# Patient Record
Sex: FEMALE | Race: OTHER | NOT HISPANIC OR LATINO
[De-identification: names, ages, dates, MRNs, and addresses within clinical notes are randomized per-mention and may not be internally consistent; named-entity substitution may affect disease eponyms.]

---

## 2023-08-28 ENCOUNTER — TRANSCRIPTION ENCOUNTER (OUTPATIENT)
Age: 73
End: 2023-08-28

## 2023-08-28 ENCOUNTER — INPATIENT (INPATIENT)
Facility: HOSPITAL | Age: 73
LOS: 3 days | Discharge: HOME | DRG: 828 | End: 2023-09-01
Attending: OBSTETRICS & GYNECOLOGY | Admitting: OBSTETRICS & GYNECOLOGY
Payer: MEDICAID

## 2023-08-28 VITALS
RESPIRATION RATE: 18 BRPM | HEART RATE: 58 BPM | HEIGHT: 64 IN | OXYGEN SATURATION: 98 % | SYSTOLIC BLOOD PRESSURE: 133 MMHG | WEIGHT: 188.05 LBS | TEMPERATURE: 98 F | DIASTOLIC BLOOD PRESSURE: 70 MMHG

## 2023-08-28 LAB
ANION GAP SERPL CALC-SCNC: 9 MMOL/L — SIGNIFICANT CHANGE UP (ref 5–17)
BASOPHILS # BLD AUTO: 0.03 K/UL — SIGNIFICANT CHANGE UP (ref 0–0.2)
BASOPHILS NFR BLD AUTO: 0.5 % — SIGNIFICANT CHANGE UP (ref 0–2)
BUN SERPL-MCNC: 43 MG/DL — HIGH (ref 7–23)
CALCIUM SERPL-MCNC: 9.3 MG/DL — SIGNIFICANT CHANGE UP (ref 8.4–10.5)
CHLORIDE SERPL-SCNC: 104 MMOL/L — SIGNIFICANT CHANGE UP (ref 96–108)
CO2 SERPL-SCNC: 28 MMOL/L — SIGNIFICANT CHANGE UP (ref 22–31)
CREAT SERPL-MCNC: 1.65 MG/DL — HIGH (ref 0.5–1.3)
EGFR: 33 ML/MIN/1.73M2 — LOW
EOSINOPHIL # BLD AUTO: 0.13 K/UL — SIGNIFICANT CHANGE UP (ref 0–0.5)
EOSINOPHIL NFR BLD AUTO: 2 % — SIGNIFICANT CHANGE UP (ref 0–6)
GLUCOSE SERPL-MCNC: 119 MG/DL — HIGH (ref 70–99)
HCT VFR BLD CALC: 38.4 % — SIGNIFICANT CHANGE UP (ref 34.5–45)
HGB BLD-MCNC: 12.3 G/DL — SIGNIFICANT CHANGE UP (ref 11.5–15.5)
IMM GRANULOCYTES NFR BLD AUTO: 0.6 % — SIGNIFICANT CHANGE UP (ref 0–0.9)
LYMPHOCYTES # BLD AUTO: 2.82 K/UL — SIGNIFICANT CHANGE UP (ref 1–3.3)
LYMPHOCYTES # BLD AUTO: 42.5 % — SIGNIFICANT CHANGE UP (ref 13–44)
MCHC RBC-ENTMCNC: 29.3 PG — SIGNIFICANT CHANGE UP (ref 27–34)
MCHC RBC-ENTMCNC: 32 GM/DL — SIGNIFICANT CHANGE UP (ref 32–36)
MCV RBC AUTO: 91.4 FL — SIGNIFICANT CHANGE UP (ref 80–100)
MONOCYTES # BLD AUTO: 0.46 K/UL — SIGNIFICANT CHANGE UP (ref 0–0.9)
MONOCYTES NFR BLD AUTO: 6.9 % — SIGNIFICANT CHANGE UP (ref 2–14)
NEUTROPHILS # BLD AUTO: 3.16 K/UL — SIGNIFICANT CHANGE UP (ref 1.8–7.4)
NEUTROPHILS NFR BLD AUTO: 47.5 % — SIGNIFICANT CHANGE UP (ref 43–77)
NRBC # BLD: 0 /100 WBCS — SIGNIFICANT CHANGE UP (ref 0–0)
PLATELET # BLD AUTO: 181 K/UL — SIGNIFICANT CHANGE UP (ref 150–400)
POTASSIUM SERPL-MCNC: 3.7 MMOL/L — SIGNIFICANT CHANGE UP (ref 3.5–5.3)
POTASSIUM SERPL-SCNC: 3.7 MMOL/L — SIGNIFICANT CHANGE UP (ref 3.5–5.3)
RBC # BLD: 4.2 M/UL — SIGNIFICANT CHANGE UP (ref 3.8–5.2)
RBC # FLD: 13.4 % — SIGNIFICANT CHANGE UP (ref 10.3–14.5)
SODIUM SERPL-SCNC: 141 MMOL/L — SIGNIFICANT CHANGE UP (ref 135–145)
WBC # BLD: 6.64 K/UL — SIGNIFICANT CHANGE UP (ref 3.8–10.5)
WBC # FLD AUTO: 6.64 K/UL — SIGNIFICANT CHANGE UP (ref 3.8–10.5)

## 2023-08-28 PROCEDURE — 99285 EMERGENCY DEPT VISIT HI MDM: CPT

## 2023-08-28 PROCEDURE — 99254 IP/OBS CNSLTJ NEW/EST MOD 60: CPT | Mod: 57

## 2023-08-28 RX ORDER — ATORVASTATIN CALCIUM 80 MG/1
80 TABLET, FILM COATED ORAL DAILY
Refills: 0 | Status: DISCONTINUED | OUTPATIENT
Start: 2023-08-28 | End: 2023-08-29

## 2023-08-28 RX ORDER — PANTOPRAZOLE SODIUM 20 MG/1
40 TABLET, DELAYED RELEASE ORAL
Refills: 0 | Status: DISCONTINUED | OUTPATIENT
Start: 2023-08-29 | End: 2023-08-29

## 2023-08-28 RX ORDER — SODIUM CHLORIDE 9 MG/ML
1000 INJECTION, SOLUTION INTRAVENOUS ONCE
Refills: 0 | Status: COMPLETED | OUTPATIENT
Start: 2023-08-28 | End: 2023-08-28

## 2023-08-28 RX ORDER — AMLODIPINE BESYLATE 2.5 MG/1
5 TABLET ORAL DAILY
Refills: 0 | Status: DISCONTINUED | OUTPATIENT
Start: 2023-08-28 | End: 2023-08-29

## 2023-08-28 RX ORDER — ASPIRIN/CALCIUM CARB/MAGNESIUM 324 MG
81 TABLET ORAL DAILY
Refills: 0 | Status: DISCONTINUED | OUTPATIENT
Start: 2023-08-28 | End: 2023-08-29

## 2023-08-28 RX ORDER — IOHEXOL 300 MG/ML
30 INJECTION, SOLUTION INTRAVENOUS ONCE
Refills: 0 | Status: COMPLETED | OUTPATIENT
Start: 2023-08-28 | End: 2023-08-29

## 2023-08-28 RX ORDER — LOSARTAN POTASSIUM 100 MG/1
100 TABLET, FILM COATED ORAL DAILY
Refills: 0 | Status: DISCONTINUED | OUTPATIENT
Start: 2023-08-28 | End: 2023-08-29

## 2023-08-28 RX ADMIN — SODIUM CHLORIDE 1000 MILLILITER(S): 9 INJECTION, SOLUTION INTRAVENOUS at 23:44

## 2023-08-28 NOTE — H&P ADULT - HISTORY OF PRESENT ILLNESS
Son at bedside assisted with translation    73 yo  PMH T2DM, HTN, HLD, Osteoarthritis presenting to [facility] after 1 night admission at [facility] for LLQ pain for which she was found to have complex L adnexal mass. Patient reports that she has been feeling L sided abdominal pressure x3 months, and for the last 2 days had new onset LLQ abdominal pain and constipation. The abdominal pain came on gradually and at its peak was 6/10, described as "pressure". Patient denies dysuria (at baseline has mild urinary incontinence), diarrhea, chest pain, shortness of breath. Her last bowel movement was yesterday, prior to that 2 days before. Adnexal mass diagnosed on CT at [facility]; this is the first time the patient has been made aware of this mass. Of note, patient lives in [place], is visiting her son in the [place] for 2 months.     OBHx   6x  between [id number], 5 living children    GYNHx  S/p open hysterectomy in  for AUB secondary to fibroids, patient knows that her ovaries were left but is not sure if her fallopian tubes/cervix were removed. LMP at this time.    PMH   T2DM  Gout  HTN  HLD  Osteoarthritis (affecting b/l knees)    PSH: As in GYN history    Meds:  Metformin 500 BID; glimepiride 1mg QD  Esomeprazole 40 QD PRN  Olmesartan 40mg QD  Amlodipine 5mg QD  HCTZ 12.5 QD  Rosuvastatin 20mg QD  ASA 81 QD    NKDA    Family history  Denies personal or family history of GYN or other cancers   Son at bedside assisted with translation    71 yo  PMH T2DM, HTN, HLD, Osteoarthritis presenting to [facility] after 1 night admission at [facility] for LLQ pain for which she was found to have complex L adnexal mass. Patient reports that she has been feeling L sided abdominal pressure/fullness x3 months, and for the last 2 days had new onset LLQ abdominal pain and constipation. The abdominal pain came on gradually and at its peak was 6/10, described as "pressure". Patient denies dysuria (at baseline has mild urinary incontinence), diarrhea, chest pain, shortness of breath. Denies unintentional weight loss, vaginal bleeding. Her last bowel movement was yesterday, prior to that 2 days before. Adnexal mass diagnosed on CT at [facility]; this is the first time the patient has been made aware of this mass. Of note, patient lives in [place], is visiting her son in the [place] for 2 months.     Baseline Cr at [facility] today 1.6    OBHx   6x  between [id number], 5 living children    GYNHx  S/p open hysterectomy in  for AUB secondary to fibroids, patient knows that her ovaries were left but is not sure if her fallopian tubes/cervix were removed. LMP at this time.    PMH   T2DM  Gout  HTN  HLD  Osteoarthritis (affecting b/l knees)    PSH: As in GYN history    Meds:  Metformin 500 BID; glimepiride 1mg QD  Esomeprazole 40 QD PRN  Olmesartan 40mg QD  Amlodipine 5mg QD  HCTZ 12.5 QD  Rosuvastatin 20mg QD  ASA 81 QD    NKDA    Family history  Denies personal or family history of GYN or other cancers   Son at bedside assisted with translation (Juanito Noguera)    71 yo  PMH T2DM, HTN, HLD, Osteoarthritis presenting to [facility] after 1 night admission at [facility] for LLQ pain for which she was found to have complex L adnexal mass. Patient reports that she has been feeling L sided abdominal pressure/fullness x3 months, and for the last 2 days had new onset LLQ abdominal pain and constipation. The abdominal pain came on gradually and at its peak was 6/10, described as "pressure". Patient denies dysuria (at baseline has mild urinary incontinence), diarrhea, chest pain, shortness of breath. Denies unintentional weight loss, vaginal bleeding. Her last bowel movement was yesterday, prior to that 2 days before. Adnexal mass diagnosed on CT at [facility]; this is the first time the patient has been made aware of this mass. Of note, patient lives in [place], is visiting her son in the [place] for 2 months.     Baseline Cr at [facility] today 1.6    OBHx   6x  between 2607-7460, 5 living children    GYNHx  S/p open hysterectomy, low transverse entry, in  for AUB secondary to fibroids, patient knows that her ovaries were left but is not sure if her fallopian tubes/cervix were removed. LMP at this time.  Patient has not seen a GYN in many years    PMH   T2DM  Gout  HTN  HLD  Osteoarthritis (affecting b/l knees)    PSH: As in GYN history    Meds:  Metformin 500 BID; glimepiride 1mg QD  Esomeprazole 40 QD PRN  Olmesartan 40mg QD  Amlodipine 5mg QD  HCTZ 12.5 QD  Rosuvastatin 20mg QD  ASA 81 QD    NKDA    Family history  Denies personal or family history of GYN or other cancers   Son at bedside assisted with translation (Juanito Noguera)    71 yo  PMH T2DM, HTN, HLD, Osteoarthritis presenting to [facility] after 1 night admission at [facility] for LLQ pain for which she was found to have complex L adnexal mass. Patient reports that she has been feeling L sided abdominal pressure/fullness x3 months, and for the last 2 days had new onset LLQ abdominal pain and constipation. The abdominal pain came on gradually and at its peak was 6/10, described as "pressure". Patient denies dysuria (at baseline has mild urinary incontinence), diarrhea, chest pain, shortness of breath. Denies unintentional weight loss, vaginal bleeding. Her last bowel movement was yesterday, prior to that 2 days before. 19cm complex adnexal mass diagnosed on CT at [facility]; this is the first time the patient has been made aware of this mass. Of note, patient lives in [place], is visiting her son in the [place] for 2 months.     Baseline Cr at [facility] today 1.6    OBHx   6x  between 1056-2102, 5 living children    GYNHx  S/p open hysterectomy, low transverse entry, in  for AUB secondary to fibroids, patient knows that her ovaries were left but is not sure if her fallopian tubes/cervix were removed. LMP at this time.  Patient has not seen a GYN in many years    PMH   T2DM  Gout  HTN  HLD  Osteoarthritis (affecting b/l knees)    PSH: As in GYN history    Meds:  Metformin 500 BID; glimepiride 1mg QD  Esomeprazole 40 QD PRN  Olmesartan 40mg QD  Amlodipine 5mg QD  HCTZ 12.5 QD  Rosuvastatin 20mg QD  ASA 81 QD    NKDA    Family history  Denies personal or family history of GYN or other cancers

## 2023-08-28 NOTE — ED PROVIDER NOTE - CLINICAL SUMMARY MEDICAL DECISION MAKING FREE TEXT BOX
71 yo female with a hx of HTN, HLD, DM presenting to the ED for a newly diagnosed ovarian mass. +Mass palpable on exam with tenderness. Will repeat imaging. Admit to GYN-ONC for further management- discussed with Dr. Bucio

## 2023-08-28 NOTE — ED ADULT NURSE NOTE - NSFALLUNIVINTERV_ED_ALL_ED
Bed/Stretcher in lowest position, wheels locked, appropriate side rails in place/Call bell, personal items and telephone in reach/Instruct patient to call for assistance before getting out of bed/chair/stretcher/Non-slip footwear applied when patient is off stretcher/[place] to call system/Physically safe environment - no spills, clutter or unnecessary equipment/Purposeful proactive rounding/Room/bathroom lighting operational, light cord in reach

## 2023-08-28 NOTE — ED PROVIDER NOTE - PHYSICAL EXAMINATION
VITAL SIGNS: I have reviewed nursing notes and confirm.  CONSTITUTIONAL: Well appearing, in no acute distress.   SKIN:  warm and dry, no acute rash.   HEAD:  normocephalic, atraumatic.  EYES: EOM intact; conjunctiva and sclera clear.  ENT: No nasal discharge; airway clear.   NECK: Supple; non tender.  CARD: S1, S2 normal; no murmurs, gallops, or rubs. Regular rate and rhythm.   RESP:  Clear to auscultation b/l, no wheezes, rales or rhonchi.  ABD: Normal bowel sounds; +abdominal distention with palpable hard mass with ttp  EXT: Normal ROM. No clubbing, cyanosis or edema. 2+ pulses to b/l ue/le.  NEURO: Alert, oriented, grossly unremarkable  PSYCH: Cooperative, mood and affect appropriate.

## 2023-08-28 NOTE — H&P ADULT - NSHPPHYSICALEXAM_GEN_ALL_CORE
Gen; Alert, NAD  Back: No CVAT  Ext: Tenderness to palpation of b/l knees, no calf tenderness or edema  Abd: Soft, mild discomfort to palpation of LLQ Gen; Alert, NAD  Back: No CVAT  Ext: Tenderness to palpation of b/l knees, no calf tenderness or edema  Abd: Soft, distended, abdominal fullness, +BS, nontympanic, no peritoneal signs; mild discomfort to palpation of LLQ  GYN: physiologic discharge in vaginal vault, no vaginal bleeding - pt unable to tolerate complete speculum exam; cervical os palpated. Significant bilateral adnexal and posterior fullness

## 2023-08-28 NOTE — ED PROVIDER NOTE - OBJECTIVE STATEMENT
73 yo female with a hx of HTN, HLD, DM presenting to the ED for a newly diagnosed ovarian mass. The patient was seen at [facility] for lower abdominal pain x months that got much worse over the past 2 days with nausea. She had a work up that showed a large cystic ovarian mass suspicious for malignancy. Her son spoke to Dr. Bucio (gyn-onc at [facility]) who advised her to come in for a surgical evaluation. Reports the pain now is 4/10 compared to 10/10 when she first arrived to [facility]. Nausea resolved now. Denies dizziness or syncope. No known hx of malignancy. Had a hysterectomy years ago for uterine fibroids. Denies urinary symptoms.

## 2023-08-28 NOTE — H&P ADULT - ASSESSMENT
73 yo  multiple medical comorbidities presenting to [facility] with new diagnosis of complex L adnexal mass diagnosed on CT this morning at [facility].   - Admit to GYN  - F/u repeat CT C/A/P with PO and IV contrast given patient unable to obtain disk  - F/u CA-125, CEA, Inhibin    Carmen PGY2 discussed with Baldev PGY4 and Dr. Perez 71 yo  multiple medical comorbidities presenting to [facility] with new diagnosis of complex L adnexal mass diagnosed on CT this morning at [facility].   - Admit to GYN  - F/u repeat CT C/A/P with PO and IV contrast given patient unable to obtain disk  - F/u CA-125, CEA, Inhibin  - NPO with IV fluids    Carmen PGY2 discussed with Baldev PGY4 and Dr. Perez 71 yo  multiple medical comorbidities presenting to [facility] with new diagnosis of complex L adnexal mass diagnosed on CT this morning at [facility].   - Admit to GYN  - F/u repeat CT C/A/P with PO and IV contrast given patient unable to obtain disk  - NPO with IV fluids  - AM T&S, CA-125, CEA Hb A1c     Carmen PGY2 discussed with Baldev PGY4 and Dr. Perez

## 2023-08-28 NOTE — H&P ADULT - NSHPREVIEWOFSYSTEMS_GEN_ALL_CORE
Denies chest pain, shortness of breath, diarrhea, dysuria  Endorses constipation, urinary incontinence at baseline

## 2023-08-28 NOTE — ED ADULT NURSE NOTE - OBJECTIVE STATEMENT
Pt. a&ox4 ambulatory with steady gait, Ukrainian speaking, hx of HTN, DM2, hysterectomy, comes to ED sent in by her GYN after an ED visit this afternoon @ [facility], for admission after CT imaging results showing an abdominal mass ; pt's coinciding s/s were abdominal pain in the LLQ for the last 3 days. Denies n/v/d weight loss, f/c, HA, fatigue, SOB, cp, dizziness, weakness.

## 2023-08-29 ENCOUNTER — RESULT REVIEW (OUTPATIENT)
Age: 73
End: 2023-08-29

## 2023-08-29 ENCOUNTER — TRANSCRIPTION ENCOUNTER (OUTPATIENT)
Age: 73
End: 2023-08-29

## 2023-08-29 LAB
A1C WITH ESTIMATED AVERAGE GLUCOSE RESULT: 6.2 % — HIGH (ref 4–5.6)
ANION GAP SERPL CALC-SCNC: 10 MMOL/L — SIGNIFICANT CHANGE UP (ref 5–17)
APPEARANCE UR: CLEAR — SIGNIFICANT CHANGE UP
APTT BLD: 27.9 SEC — SIGNIFICANT CHANGE UP (ref 24.5–35.6)
APTT BLD: 30 SEC — SIGNIFICANT CHANGE UP (ref 24.5–35.6)
BACTERIA # UR AUTO: SIGNIFICANT CHANGE UP /HPF
BASOPHILS # BLD AUTO: 0.01 K/UL — SIGNIFICANT CHANGE UP (ref 0–0.2)
BASOPHILS NFR BLD AUTO: 0.2 % — SIGNIFICANT CHANGE UP (ref 0–2)
BILIRUB UR-MCNC: NEGATIVE — SIGNIFICANT CHANGE UP
BLD GP AB SCN SERPL QL: NEGATIVE — SIGNIFICANT CHANGE UP
BUN SERPL-MCNC: 39 MG/DL — HIGH (ref 7–23)
CALCIUM SERPL-MCNC: 8.9 MG/DL — SIGNIFICANT CHANGE UP (ref 8.4–10.5)
CANCER AG125 SERPL-ACNC: 65 U/ML — HIGH
CEA SERPL-MCNC: 2 NG/ML — SIGNIFICANT CHANGE UP (ref 0–3.8)
CHLORIDE SERPL-SCNC: 104 MMOL/L — SIGNIFICANT CHANGE UP (ref 96–108)
CO2 SERPL-SCNC: 26 MMOL/L — SIGNIFICANT CHANGE UP (ref 22–31)
COLOR SPEC: YELLOW — SIGNIFICANT CHANGE UP
CREAT SERPL-MCNC: 1.47 MG/DL — HIGH (ref 0.5–1.3)
DIFF PNL FLD: ABNORMAL
EGFR: 38 ML/MIN/1.73M2 — LOW
EOSINOPHIL # BLD AUTO: 0.13 K/UL — SIGNIFICANT CHANGE UP (ref 0–0.5)
EOSINOPHIL NFR BLD AUTO: 2.3 % — SIGNIFICANT CHANGE UP (ref 0–6)
EPI CELLS # UR: ABNORMAL /HPF (ref 0–5)
ESTIMATED AVERAGE GLUCOSE: 131 MG/DL — HIGH (ref 68–114)
GLUCOSE BLDC GLUCOMTR-MCNC: 228 MG/DL — HIGH (ref 70–99)
GLUCOSE BLDC GLUCOMTR-MCNC: 252 MG/DL — HIGH (ref 70–99)
GLUCOSE BLDC GLUCOMTR-MCNC: 260 MG/DL — HIGH (ref 70–99)
GLUCOSE BLDC GLUCOMTR-MCNC: 86 MG/DL — SIGNIFICANT CHANGE UP (ref 70–99)
GLUCOSE BLDC GLUCOMTR-MCNC: 92 MG/DL — SIGNIFICANT CHANGE UP (ref 70–99)
GLUCOSE BLDC GLUCOMTR-MCNC: 97 MG/DL — SIGNIFICANT CHANGE UP (ref 70–99)
GLUCOSE SERPL-MCNC: 81 MG/DL — SIGNIFICANT CHANGE UP (ref 70–99)
GLUCOSE UR QL: NEGATIVE — SIGNIFICANT CHANGE UP
HCT VFR BLD CALC: 35.5 % — SIGNIFICANT CHANGE UP (ref 34.5–45)
HGB BLD-MCNC: 11 G/DL — LOW (ref 11.5–15.5)
IMM GRANULOCYTES NFR BLD AUTO: 0.5 % — SIGNIFICANT CHANGE UP (ref 0–0.9)
INR BLD: 0.99 — SIGNIFICANT CHANGE UP (ref 0.85–1.18)
KETONES UR-MCNC: NEGATIVE — SIGNIFICANT CHANGE UP
LEUKOCYTE ESTERASE UR-ACNC: NEGATIVE — SIGNIFICANT CHANGE UP
LYMPHOCYTES # BLD AUTO: 2.78 K/UL — SIGNIFICANT CHANGE UP (ref 1–3.3)
LYMPHOCYTES # BLD AUTO: 49.1 % — HIGH (ref 13–44)
MAGNESIUM SERPL-MCNC: 1.7 MG/DL — SIGNIFICANT CHANGE UP (ref 1.6–2.6)
MCHC RBC-ENTMCNC: 28.7 PG — SIGNIFICANT CHANGE UP (ref 27–34)
MCHC RBC-ENTMCNC: 31 GM/DL — LOW (ref 32–36)
MCV RBC AUTO: 92.7 FL — SIGNIFICANT CHANGE UP (ref 80–100)
MONOCYTES # BLD AUTO: 0.5 K/UL — SIGNIFICANT CHANGE UP (ref 0–0.9)
MONOCYTES NFR BLD AUTO: 8.8 % — SIGNIFICANT CHANGE UP (ref 2–14)
NEUTROPHILS # BLD AUTO: 2.21 K/UL — SIGNIFICANT CHANGE UP (ref 1.8–7.4)
NEUTROPHILS NFR BLD AUTO: 39.1 % — LOW (ref 43–77)
NITRITE UR-MCNC: NEGATIVE — SIGNIFICANT CHANGE UP
NRBC # BLD: 0 /100 WBCS — SIGNIFICANT CHANGE UP (ref 0–0)
PH UR: 5.5 — SIGNIFICANT CHANGE UP (ref 5–8)
PHOSPHATE SERPL-MCNC: 3.8 MG/DL — SIGNIFICANT CHANGE UP (ref 2.5–4.5)
PLATELET # BLD AUTO: 156 K/UL — SIGNIFICANT CHANGE UP (ref 150–400)
POTASSIUM SERPL-MCNC: 3.6 MMOL/L — SIGNIFICANT CHANGE UP (ref 3.5–5.3)
POTASSIUM SERPL-SCNC: 3.6 MMOL/L — SIGNIFICANT CHANGE UP (ref 3.5–5.3)
PROT UR-MCNC: ABNORMAL MG/DL
PROTHROM AB SERPL-ACNC: 11.3 SEC — SIGNIFICANT CHANGE UP (ref 9.5–13)
RBC # BLD: 3.83 M/UL — SIGNIFICANT CHANGE UP (ref 3.8–5.2)
RBC # FLD: 13.3 % — SIGNIFICANT CHANGE UP (ref 10.3–14.5)
RBC CASTS # UR COMP ASSIST: < 5 /HPF — SIGNIFICANT CHANGE UP
RH IG SCN BLD-IMP: POSITIVE — SIGNIFICANT CHANGE UP
SODIUM SERPL-SCNC: 140 MMOL/L — SIGNIFICANT CHANGE UP (ref 135–145)
SP GR SPEC: 1.01 — SIGNIFICANT CHANGE UP (ref 1–1.03)
UROBILINOGEN FLD QL: 0.2 E.U./DL — SIGNIFICANT CHANGE UP
WBC # BLD: 5.66 K/UL — SIGNIFICANT CHANGE UP (ref 3.8–10.5)
WBC # FLD AUTO: 5.66 K/UL — SIGNIFICANT CHANGE UP (ref 3.8–10.5)
WBC UR QL: < 5 /HPF — SIGNIFICANT CHANGE UP

## 2023-08-29 PROCEDURE — 52332 CYSTOSCOPY AND TREATMENT: CPT | Mod: 80

## 2023-08-29 PROCEDURE — 88112 CYTOPATH CELL ENHANCE TECH: CPT | Mod: 26

## 2023-08-29 PROCEDURE — 52332 CYSTOSCOPY AND TREATMENT: CPT

## 2023-08-29 PROCEDURE — 58720 REMOVAL OF OVARY/TUBE(S): CPT

## 2023-08-29 PROCEDURE — 88304 TISSUE EXAM BY PATHOLOGIST: CPT | Mod: 26

## 2023-08-29 PROCEDURE — 88305 TISSUE EXAM BY PATHOLOGIST: CPT | Mod: 26

## 2023-08-29 PROCEDURE — 74176 CT ABD & PELVIS W/O CONTRAST: CPT | Mod: 26

## 2023-08-29 PROCEDURE — 58720 REMOVAL OF OVARY/TUBE(S): CPT | Mod: 80

## 2023-08-29 PROCEDURE — 71260 CT THORAX DX C+: CPT | Mod: 26

## 2023-08-29 PROCEDURE — 74177 CT ABD & PELVIS W/CONTRAST: CPT | Mod: 26

## 2023-08-29 PROCEDURE — 88307 TISSUE EXAM BY PATHOLOGIST: CPT | Mod: 26

## 2023-08-29 DEVICE — ARISTA 3GR: Type: IMPLANTABLE DEVICE | Status: FUNCTIONAL

## 2023-08-29 DEVICE — URETERAL CATH OPEN END 5FR 70CM: Type: IMPLANTABLE DEVICE | Status: FUNCTIONAL

## 2023-08-29 RX ORDER — DEXTROSE 50 % IN WATER 50 %
12.5 SYRINGE (ML) INTRAVENOUS ONCE
Refills: 0 | Status: DISCONTINUED | OUTPATIENT
Start: 2023-08-29 | End: 2023-09-01

## 2023-08-29 RX ORDER — SIMETHICONE 80 MG/1
80 TABLET, CHEWABLE ORAL EVERY 6 HOURS
Refills: 0 | Status: DISCONTINUED | OUTPATIENT
Start: 2023-08-29 | End: 2023-09-01

## 2023-08-29 RX ORDER — SODIUM CHLORIDE 9 MG/ML
1000 INJECTION, SOLUTION INTRAVENOUS
Refills: 0 | Status: DISCONTINUED | OUTPATIENT
Start: 2023-08-29 | End: 2023-09-01

## 2023-08-29 RX ORDER — OXYCODONE HYDROCHLORIDE 5 MG/1
5 TABLET ORAL EVERY 6 HOURS
Refills: 0 | Status: DISCONTINUED | OUTPATIENT
Start: 2023-08-29 | End: 2023-09-01

## 2023-08-29 RX ORDER — DEXTROSE 50 % IN WATER 50 %
25 SYRINGE (ML) INTRAVENOUS ONCE
Refills: 0 | Status: DISCONTINUED | OUTPATIENT
Start: 2023-08-29 | End: 2023-09-01

## 2023-08-29 RX ORDER — METOCLOPRAMIDE HCL 10 MG
10 TABLET ORAL EVERY 6 HOURS
Refills: 0 | Status: DISCONTINUED | OUTPATIENT
Start: 2023-08-29 | End: 2023-09-01

## 2023-08-29 RX ORDER — ACETAMINOPHEN 500 MG
1000 TABLET ORAL EVERY 6 HOURS
Refills: 0 | Status: DISCONTINUED | OUTPATIENT
Start: 2023-08-29 | End: 2023-08-29

## 2023-08-29 RX ORDER — POTASSIUM CHLORIDE 20 MEQ
20 PACKET (EA) ORAL ONCE
Refills: 0 | Status: DISCONTINUED | OUTPATIENT
Start: 2023-08-29 | End: 2023-08-29

## 2023-08-29 RX ORDER — IBUPROFEN 200 MG
600 TABLET ORAL EVERY 6 HOURS
Refills: 0 | Status: DISCONTINUED | OUTPATIENT
Start: 2023-08-29 | End: 2023-08-29

## 2023-08-29 RX ORDER — HEPARIN SODIUM 5000 [USP'U]/ML
5000 INJECTION INTRAVENOUS; SUBCUTANEOUS ONCE
Refills: 0 | Status: COMPLETED | OUTPATIENT
Start: 2023-08-29 | End: 2023-08-29

## 2023-08-29 RX ORDER — INSULIN LISPRO 100/ML
VIAL (ML) SUBCUTANEOUS
Refills: 0 | Status: DISCONTINUED | OUTPATIENT
Start: 2023-08-29 | End: 2023-09-01

## 2023-08-29 RX ORDER — GLUCAGON INJECTION, SOLUTION 0.5 MG/.1ML
1 INJECTION, SOLUTION SUBCUTANEOUS ONCE
Refills: 0 | Status: DISCONTINUED | OUTPATIENT
Start: 2023-08-29 | End: 2023-09-01

## 2023-08-29 RX ORDER — ACETAMINOPHEN 500 MG
1000 TABLET ORAL ONCE
Refills: 0 | Status: COMPLETED | OUTPATIENT
Start: 2023-08-29 | End: 2023-08-29

## 2023-08-29 RX ORDER — ACETAMINOPHEN 500 MG
1000 TABLET ORAL EVERY 6 HOURS
Refills: 0 | Status: DISCONTINUED | OUTPATIENT
Start: 2023-08-29 | End: 2023-09-01

## 2023-08-29 RX ORDER — HYDROMORPHONE HYDROCHLORIDE 2 MG/ML
0.5 INJECTION INTRAMUSCULAR; INTRAVENOUS; SUBCUTANEOUS
Refills: 0 | Status: DISCONTINUED | OUTPATIENT
Start: 2023-08-29 | End: 2023-08-31

## 2023-08-29 RX ORDER — OXYCODONE HYDROCHLORIDE 5 MG/1
10 TABLET ORAL EVERY 6 HOURS
Refills: 0 | Status: DISCONTINUED | OUTPATIENT
Start: 2023-08-29 | End: 2023-09-01

## 2023-08-29 RX ORDER — ONDANSETRON 8 MG/1
8 TABLET, FILM COATED ORAL EVERY 6 HOURS
Refills: 0 | Status: DISCONTINUED | OUTPATIENT
Start: 2023-08-29 | End: 2023-08-29

## 2023-08-29 RX ORDER — MAGNESIUM SULFATE 500 MG/ML
2 VIAL (ML) INJECTION ONCE
Refills: 0 | Status: DISCONTINUED | OUTPATIENT
Start: 2023-08-29 | End: 2023-08-29

## 2023-08-29 RX ORDER — DEXTROSE 50 % IN WATER 50 %
15 SYRINGE (ML) INTRAVENOUS ONCE
Refills: 0 | Status: DISCONTINUED | OUTPATIENT
Start: 2023-08-29 | End: 2023-09-01

## 2023-08-29 RX ORDER — ONDANSETRON 8 MG/1
8 TABLET, FILM COATED ORAL EVERY 6 HOURS
Refills: 0 | Status: DISCONTINUED | OUTPATIENT
Start: 2023-08-29 | End: 2023-09-01

## 2023-08-29 RX ORDER — SODIUM CHLORIDE 9 MG/ML
1000 INJECTION, SOLUTION INTRAVENOUS
Refills: 0 | Status: DISCONTINUED | OUTPATIENT
Start: 2023-08-29 | End: 2023-08-29

## 2023-08-29 RX ORDER — POTASSIUM CHLORIDE 20 MEQ
20 PACKET (EA) ORAL
Refills: 0 | Status: DISCONTINUED | OUTPATIENT
Start: 2023-08-29 | End: 2023-08-29

## 2023-08-29 RX ORDER — CELECOXIB 200 MG/1
400 CAPSULE ORAL ONCE
Refills: 0 | Status: COMPLETED | OUTPATIENT
Start: 2023-08-29 | End: 2023-08-29

## 2023-08-29 RX ORDER — KETOROLAC TROMETHAMINE 30 MG/ML
30 SYRINGE (ML) INJECTION EVERY 6 HOURS
Refills: 0 | Status: DISCONTINUED | OUTPATIENT
Start: 2023-08-29 | End: 2023-08-29

## 2023-08-29 RX ADMIN — Medication 20 MILLIEQUIVALENT(S): at 08:54

## 2023-08-29 RX ADMIN — PANTOPRAZOLE SODIUM 40 MILLIGRAM(S): 20 TABLET, DELAYED RELEASE ORAL at 06:07

## 2023-08-29 RX ADMIN — Medication 6: at 18:32

## 2023-08-29 RX ADMIN — CELECOXIB 400 MILLIGRAM(S): 200 CAPSULE ORAL at 11:11

## 2023-08-29 RX ADMIN — IOHEXOL 30 MILLILITER(S): 300 INJECTION, SOLUTION INTRAVENOUS at 00:30

## 2023-08-29 RX ADMIN — Medication 1000 MILLIGRAM(S): at 11:12

## 2023-08-29 RX ADMIN — HEPARIN SODIUM 5000 UNIT(S): 5000 INJECTION INTRAVENOUS; SUBCUTANEOUS at 11:12

## 2023-08-29 RX ADMIN — LOSARTAN POTASSIUM 100 MILLIGRAM(S): 100 TABLET, FILM COATED ORAL at 06:05

## 2023-08-29 RX ADMIN — AMLODIPINE BESYLATE 5 MILLIGRAM(S): 2.5 TABLET ORAL at 06:07

## 2023-08-29 NOTE — ED ADULT NURSE REASSESSMENT NOTE - NS ED NURSE REASSESS COMMENT FT1
NICK SPANGLER  : 1964 11:41:30  ACCOUNT:  [id number]  HOME PHONE:  [phone]  WORK PHONE:  [phone]        [Reply]  Please return call to patient teacher Break 11:30- noon and again 2:20 pm [phone]: possible samples of Repatha until it is resoloved w/ insurance.  Patient of Dr. ARAUJO is feeling great being on it -     [employer] called stating PA for Repatha denied and letter faxed to clincal fax.  Do you have this?       Above message reviewed along with EMR notes. Hernandez RN had faxed additional information about Repatha to insurance on 19 toward the PA. Await determination. Pt can contact [facility] office at [phone] for samples to  from that office. Asked to call ahead to make sure they have samples available at that time. Voice mail rec'd and message for pt to call back to discuss. JULIANNE     Spoke to GYN residents, CT with PO contrast to be performed before pt. goes upstairs to room.

## 2023-08-29 NOTE — PROGRESS NOTE ADULT - SUBJECTIVE AND OBJECTIVE BOX
GYN POC   Patient seen at bedside. Son assisted with translation (name in H+P). Patient reports pain well controlled. Denies chest pain, shortness of breath. Has not yet had sips, no nausea. Bishop in situ. Not OOB yet. On 3 L NC due to saturating high 80s on RA but asymptomatic.    Vital Signs Last 24 Hrs  T(C): 36.4 (29 Aug 2023 12:24), Max: 36.9 (29 Aug 2023 05:33)  T(F): 97.6 (29 Aug 2023 09:34), Max: 98.5 (29 Aug 2023 05:33)  HR: 59 (29 Aug 2023 18:36) (51 - 70)  BP: 107/53 (29 Aug 2023 18:36) (91/50 - 142/86)  BP(mean): 76 (29 Aug 2023 18:36) (66 - 77)  RR: 23 (29 Aug 2023 18:36) (12 - 23)  SpO2: 95% (29 Aug 2023 18:36) (94% - 98%)    Parameters below as of 29 Aug 2023 17:06  Patient On (Oxygen Delivery Method): nasal cannula  O2 Flow (L/min): 3      Physical Exam:  Gen: No Acute Distress  Pulm: Breathing comfortably on 3L NC  GI: soft, appropriately tender, no rebound or guarding. Incisions clean/dry/intact.  : Bishop in place  Ext: SCDs in place, wnl    I&O's Summary    28 Aug 2023 07:01  -  29 Aug 2023 07:00  --------------------------------------------------------  IN: 625 mL / OUT: 550 mL / NET: 75 mL      MEDICATIONS  (STANDING):  acetaminophen     Tablet .. 1000 milliGRAM(s) Oral every 6 hours  dextrose 5%. 1000 milliLiter(s) (100 mL/Hr) IV Continuous <Continuous>  dextrose 5%. 1000 milliLiter(s) (50 mL/Hr) IV Continuous <Continuous>  dextrose 50% Injectable 25 Gram(s) IV Push once  dextrose 50% Injectable 12.5 Gram(s) IV Push once  dextrose 50% Injectable 25 Gram(s) IV Push once  glucagon  Injectable 1 milliGRAM(s) IntraMuscular once  insulin lispro (ADMELOG) corrective regimen sliding scale   SubCutaneous three times a day before meals  simethicone 80 milliGRAM(s) Chew every 6 hours    MEDICATIONS  (PRN):  dextrose Oral Gel 15 Gram(s) Oral once PRN Blood Glucose LESS THAN 70 milliGRAM(s)/deciliter  HYDROmorphone  Injectable 0.5 milliGRAM(s) IV Push every 15 minutes PRN breakthrough pain in the PACU  metoclopramide Injectable 10 milliGRAM(s) IV Push every 6 hours PRN 2nd line for N/V  ondansetron Injectable 8 milliGRAM(s) IV Push every 6 hours PRN 1st line for N/V  oxyCODONE    IR 5 milliGRAM(s) Oral every 6 hours PRN Moderate Pain (4 - 6)  oxyCODONE    IR 10 milliGRAM(s) Oral every 6 hours PRN Severe Pain (7 - 10)    Allergies    No Known Allergies    Intolerances        LABS:                        11.0   5.66  )-----------( 156      ( 29 Aug 2023 06:17 )             35.5     08-29    140  |  104  |  39<H>  ----------------------------<  81  3.6   |  26  |  1.47<H>    Ca    8.9      29 Aug 2023 06:17  Phos  3.8     08-29  Mg     1.7     08-29      PT/INR - ( 29 Aug 2023 09:07 )   PT: 11.3 sec;   INR: 0.99          PTT - ( 29 Aug 2023 10:32 )  PTT:30.0 sec  Urinalysis Basic - ( 29 Aug 2023 06:17 )    Color: x / Appearance: x / SG: x / pH: x  Gluc: 81 mg/dL / Ketone: x  / Bili: x / Urobili: x   Blood: x / Protein: x / Nitrite: x   Leuk Esterase: x / RBC: x / WBC x   Sq Epi: x / Non Sq Epi: x / Bacteria: x

## 2023-08-29 NOTE — PATIENT PROFILE ADULT - FALL HARM RISK - UNIVERSAL INTERVENTIONS
Bed in lowest position, wheels locked, appropriate side rails in place/Call bell, personal items and telephone in reach/Instruct patient to call for assistance before getting out of bed or chair/Non-slip footwear when patient is out of bed/[place] to call system/Physically safe environment - no spills, clutter or unnecessary equipment/Purposeful Proactive Rounding/Room/bathroom lighting operational, light cord in reach

## 2023-08-29 NOTE — PRE-OP CHECKLIST - SELECT TESTS ORDERED
BMP/CBC/PT/PTT/INR/Type and Screen/CXR/POCT Blood Glucose BMP/CBC/PT/PTT/INR/Type and Screen/EKG/CXR/POCT Blood Glucose

## 2023-08-29 NOTE — ED ADULT NURSE REASSESSMENT NOTE - NS ED NURSE REASSESS COMMENT FT1
miscommunication occurred between SSA / charge RN / CT, pt. was brought over to CT with IV contrast, when order was for OC / IC. Pt. never received or drank OC. OBGYN paged and asked if scans with IC only is adequate. awaiting return page. Primary RN was not consulted before bringing pt. to CT.

## 2023-08-29 NOTE — PROGRESS NOTE ADULT - ASSESSMENT
HD1/POD0 s/p ex-lap L cystectomy, LUZ, hernia repair, ureterolysis and cystoscopy w/ brief placement of stent - frozen benign, pelvic washings sent, 1 x bowel oversew  Neuro: Tylenol/Oxy. Holding NSAIDs elevated Cr  CV: HTN, HLD, holding home meds post-op: Atorvastatin 80 QD, Amlodipine 5 QD, HCTZ 12,5, Losartan 100 mg QD (therapeutic exchanges); ASA held   Pulm: RA  Endo: ISS, A1C 6.2%  FEN/GI: Reg diet, Protonix 40 QAM  GYN: s/p ex-lap, midline vertical incision for L adnexal mass  : Bishop  Heme: H 12.3  DVT ppx: SCDs

## 2023-08-29 NOTE — PROGRESS NOTE ADULT - SUBJECTIVE AND OBJECTIVE BOX
Pt seen and examined at bedside. Pt states no abdominal pain currently - only bloating/pressure. Pt is ambulating, voiding, NPO for possible procedure today, +flatus.   Pt denies fever, chills, chest pain, SOB, nausea, vomiting, lightheadedness, dizziness.      T(F): 98.5 (08-29-23 @ 05:33), Max: 98.5 (08-29-23 @ 05:33)  HR: 64 (08-29-23 @ 05:33) (58 - 70)  BP: 124/65 (08-29-23 @ 05:33) (122/71 - 142/86)  RR: 18 (08-29-23 @ 05:33) (18 - 18)  SpO2: 94% (08-29-23 @ 05:33) (94% - 98%)    I&O's Summary    28 Aug 2023 07:01  -  29 Aug 2023 07:00  --------------------------------------------------------  IN: 625 mL / OUT: 550 mL / NET: 75 mL        MEDICATIONS  (STANDING):  amLODIPine   Tablet 5 milliGRAM(s) Oral daily  atorvastatin 80 milliGRAM(s) Oral daily  hydrochlorothiazide 12.5 milliGRAM(s) Oral daily  lactated ringers. 1000 milliLiter(s) (125 mL/Hr) IV Continuous <Continuous>  losartan 100 milliGRAM(s) Oral daily  pantoprazole    Tablet 40 milliGRAM(s) Oral before breakfast    MEDICATIONS  (PRN):      Physical Exam:  Constitutional: NAD  Pulmonary: no incr. WOB  Cardiovascular: Regular rate and rhythm   Abdomen: incision site clean, dry, intact. Soft, mildly tender, very distended, no guarding, no rebound, +bowel sounds  Extremities: no lower extremity edema or calve tenderness. SCDs in place     LABS:                        11.0   5.66  )-----------( 156      ( 29 Aug 2023 06:17 )             35.5     08-29    140  |  104  |  39<H>  ----------------------------<  81  3.6   |  26  |  1.47<H>    Ca    8.9      29 Aug 2023 06:17  Phos  3.8     08-29  Mg     1.7     08-29        Urinalysis Basic - ( 29 Aug 2023 06:17 )    Color: x / Appearance: x / SG: x / pH: x  Gluc: 81 mg/dL / Ketone: x  / Bili: x / Urobili: x   Blood: x / Protein: x / Nitrite: x   Leuk Esterase: x / RBC: x / WBC x   Sq Epi: x / Non Sq Epi: x / Bacteria: x        RADIOLOGY & ADDITIONAL TESTS:      [place] CT 8/29:19cm multiseptated cystic mass in pelvis. Ovarian neoplasia or other process is not excluded. Recommend clinical correlation.     CT AP 8/29:  IMPRESSION:  1.   Motion artifact limits this study.  2.   Large 15.2 x 14 x 18.2 cm left pelvic mass, neoplasm cannot be   excluded.  3.   Round 4 cm hiatal hernia.  4.   No evidence of small bowel obstruction.  5.   Bilateral renal cysts as described above. No further workup currently  recommended.    CT Chest 8/29:  IMPRESSION:  1.   Mild hazy opacity right upper lobe possible inflammation/infection.  2.   Minimal lingular, right middle and bilateral lower lobe  atelectasis/scarring.  3.   Round 4 cm hiatal hernia.

## 2023-08-29 NOTE — PRE-ANESTHESIA EVALUATION ADULT - NSANTHPEFT_GEN_ALL_CORE
General: Appearance is consistent with chronological age. No abnormal facies.  EENT: Anicteric sclera; oropharynx clear, moist mucus membranes  Cardiovascular: Rate and rhythm evaluated  Respiratory: Unlabored breathing  Neurological: Awake and alert, moves all extremities  Constitutional: MET>4. General: Appearance is consistent with chronological age. No abnormal facies.  EENT: Anicteric sclera; oropharynx clear, moist mucus membranes  Cardiovascular: Rate and rhythm evaluated  Respiratory: Unlabored breathing  Neurological: Awake and alert, moves all extremities  Constitutional: MET>4.    +20G PIV to R AC in-situ

## 2023-08-29 NOTE — BRIEF OPERATIVE NOTE - NSICDXBRIEFPROCEDURE_GEN_ALL_CORE_FT
PROCEDURES:  Exploratory laparotomy 29-Aug-2023 16:37:40  Sathish Bronson  Laparotomy, exploratory, with pelvic mass excision and salpingo-oophorectomy 29-Aug-2023 16:38:05  Sathish Bronson  Cystoscopy 29-Aug-2023 16:38:31  Sathish Bronson  Insertion, ureteral stent 29-Aug-2023 16:38:38  Sathish Bronson

## 2023-08-29 NOTE — PROGRESS NOTE ADULT - ASSESSMENT
71 yo  w/ multiple medical comorbidities, LMP 2003 s/p RUBÉN (2003) for AUB/fibroids presenting with 3 months of LLQ pressure, 2 days of LLQ pain, and new found 19cm complex adnexal mass., HD1    Neuro: pain well controlled. Holding NSAIDs elevated Cr  CV: HTN, HLD, ordered for Atorvastatin 80 QD, Amlodipine 5 QD, HCTZ 12,5, Losartan 100 mg QD (therapeutic exchanges); ASA held   Pulm: RA  Endo: FS q6h (Holding ISS while NPO), A1C 6.2%  FEN/GI: NPO, LR 125cc/hr, Protonix 40 QAM  GYN: 18cm L adnexal mass + abdominal bloating, concerning for neoplasm  : Voiding, Baseline Cr 1.6  Heme: H 12.3  DVT ppx: SCDs    A+     Plan to f/u AM labs, keep NPO for possible add-on today or this week.   73 yo  w/ multiple medical comorbidities, LMP  s/p RUBÉN () for AUB/fibroids presenting with 3 months of LLQ pressure, 2 days of LLQ pain, and new found 19cm complex adnexal mass., HD1    Neuro: pain well controlled. Holding NSAIDs elevated Cr  CV: HTN, HLD, ordered for Atorvastatin 80 QD, Amlodipine 5 QD, HCTZ 12,5, Losartan 100 mg QD (therapeutic exchanges); ASA held   Pulm: RA  Endo: FS q6h (Holding ISS while NPO), A1C 6.2%  FEN/GI: NPO, LR 125cc/hr, Protonix 40 QAM  GYN: 18cm L adnexal mass + abdominal bloating, concerning for neoplasm  : Voiding, Baseline Cr 1.6  Heme: H 12.3  DVT ppx: SCDs    A+     Plan to f/u AM labs, keep NPO for possible add-on today or this week.    ATTENDING SUMMARY:  73yo (HTN, DM) with newly diagnosed large adnexal mass, no evidence of metastases, some mass effect on L renal system. Prior hysterectomy via open transverse incision.  mildly elevated at 65, CEA WNL.   Discussed with pt and son that the next step is to remove the mass intact, through a midline vertical incision. Goal is to remove the mass intact and prevent rupture. Frozen section evaluation to be used to help identify origin and cause. DDx includes benign, borderline and malignancy. Pt agrees and desires BSO at the time of surgery irrespective of pathology. Also understands that a frozen finding of borderline tumor or malignancy will result in resection of all implants within the abd and/or staging. Also discussed the proximity of L ureter and the need for likely dissection during mass resection.  Risks discussed - bleeding, infection, damage to surrounding organs (bladder, ureter, bowel, vessels, nerves), need for return surgery if frozen section evaluation does not match final pathology, reoperation to correct complication, blood clots. Pt and son were given ample time to ask questions which we all answered to the best of my ability and to their satisfaction. Pt voiced understanding and signed the consent, and is agreeable to blood products should they be necessary.

## 2023-08-29 NOTE — PRE-ANESTHESIA EVALUATION ADULT - NSANTHHOMEMEDSFT_GEN_ALL_CORE
Metformin 500 BID; glimepiride 1mg QD  Esomeprazole 40 QD PRN  Olmesartan 40mg QD  Amlodipine 5mg QD  HCTZ 12.5 QD  Rosuvastatin 20mg QD  ASA 81 QD

## 2023-08-29 NOTE — PRE-ANESTHESIA EVALUATION ADULT - NSANTHPMHFT_GEN_ALL_CORE
+T2DM managed with metformin and glybuyride outpatient; [facility] inpatient  +HTN on three agents - ACEi, HCTZ, amlodipine  S/p remote hysterectomy, unable to elaborate +T2DM managed with metformin and glimepiride outpatient; ISS inpatient  +HTN on three agents - ACEi, HCTZ, amlodipine  +OA, worst to b/l knees; not on chronic pain medications  +HLD on a statin  S/p remote hysterectomy, unable to elaborate    Presenting with abdominal distension and pressure-like pain x3-4 mos. Denying pain and nausea at time of exam.

## 2023-08-29 NOTE — BRIEF OPERATIVE NOTE - OPERATION/FINDINGS
Examination under anaesthesia revealed a large pelvic mass (25cm) and cervix palpable on pelvic exam, and umbilical hernia on abdominal exam. Midline vertical skin incision made from pubic symphysis to 3cm above the umbilicus. Adhesions from small bowel to abdominal side walls noted, lysed sharply. Pelvic washings taking. Multi-loculated pelvic mass arising from left pelvic side wall noted, and adherent to the small bowel and posteriorly to the sacrum. Adhesions taken down with intra-operative rupture of the cyst (clear yellow fluid noted). Pelvic mass sent for frozen section - benign serous cystadenoma. Bilateral ureterolysis performed. No peristalsis noted on the left ureter therefore cystoscopy performed, bilateral ureteral jets visualised, insertion of left ureteral stent (passed easily without complication), abdominally palpated and stent was then removed. Nancy placed over surgical site. Umbilical hernia sac excised. Fascia approximated with loop PDS. Subcutaneous fat closed in layers. Skin closed with monocryl and dermabond.

## 2023-08-30 LAB
ANION GAP SERPL CALC-SCNC: 11 MMOL/L — SIGNIFICANT CHANGE UP (ref 5–17)
BASOPHILS # BLD AUTO: 0.01 K/UL — SIGNIFICANT CHANGE UP (ref 0–0.2)
BASOPHILS NFR BLD AUTO: 0.1 % — SIGNIFICANT CHANGE UP (ref 0–2)
BUN SERPL-MCNC: 37 MG/DL — HIGH (ref 7–23)
CALCIUM SERPL-MCNC: 8.6 MG/DL — SIGNIFICANT CHANGE UP (ref 8.4–10.5)
CHLORIDE SERPL-SCNC: 102 MMOL/L — SIGNIFICANT CHANGE UP (ref 96–108)
CO2 SERPL-SCNC: 23 MMOL/L — SIGNIFICANT CHANGE UP (ref 22–31)
CREAT ?TM UR-MCNC: 105 MG/DL — SIGNIFICANT CHANGE UP
CREAT SERPL-MCNC: 1.73 MG/DL — HIGH (ref 0.5–1.3)
EGFR: 31 ML/MIN/1.73M2 — LOW
EOSINOPHIL # BLD AUTO: 0 K/UL — SIGNIFICANT CHANGE UP (ref 0–0.5)
EOSINOPHIL NFR BLD AUTO: 0 % — SIGNIFICANT CHANGE UP (ref 0–6)
GLUCOSE BLDC GLUCOMTR-MCNC: 148 MG/DL — HIGH (ref 70–99)
GLUCOSE BLDC GLUCOMTR-MCNC: 229 MG/DL — HIGH (ref 70–99)
GLUCOSE BLDC GLUCOMTR-MCNC: 242 MG/DL — HIGH (ref 70–99)
GLUCOSE BLDC GLUCOMTR-MCNC: 291 MG/DL — HIGH (ref 70–99)
GLUCOSE SERPL-MCNC: 227 MG/DL — HIGH (ref 70–99)
HBV SURFACE AG SER-ACNC: SIGNIFICANT CHANGE UP
HCT VFR BLD CALC: 32 % — LOW (ref 34.5–45)
HCV AB S/CO SERPL IA: 3.95 S/CO — HIGH
HCV AB SERPL-IMP: REACTIVE
HCV RNA FLD QL NAA+PROBE: SIGNIFICANT CHANGE UP
HCV RNA SPEC QL PROBE+SIG AMP: SIGNIFICANT CHANGE UP
HGB BLD-MCNC: 10.4 G/DL — LOW (ref 11.5–15.5)
HIV 1+2 AB+HIV1 P24 AG SERPL QL IA: SIGNIFICANT CHANGE UP
IMM GRANULOCYTES NFR BLD AUTO: 0.5 % — SIGNIFICANT CHANGE UP (ref 0–0.9)
LYMPHOCYTES # BLD AUTO: 0.84 K/UL — LOW (ref 1–3.3)
LYMPHOCYTES # BLD AUTO: 6.2 % — LOW (ref 13–44)
MAGNESIUM SERPL-MCNC: 1.7 MG/DL — SIGNIFICANT CHANGE UP (ref 1.6–2.6)
MCHC RBC-ENTMCNC: 29.5 PG — SIGNIFICANT CHANGE UP (ref 27–34)
MCHC RBC-ENTMCNC: 32.5 GM/DL — SIGNIFICANT CHANGE UP (ref 32–36)
MCV RBC AUTO: 90.7 FL — SIGNIFICANT CHANGE UP (ref 80–100)
MONOCYTES # BLD AUTO: 0.7 K/UL — SIGNIFICANT CHANGE UP (ref 0–0.9)
MONOCYTES NFR BLD AUTO: 5.2 % — SIGNIFICANT CHANGE UP (ref 2–14)
NEUTROPHILS # BLD AUTO: 11.93 K/UL — HIGH (ref 1.8–7.4)
NEUTROPHILS NFR BLD AUTO: 88 % — HIGH (ref 43–77)
NRBC # BLD: 0 /100 WBCS — SIGNIFICANT CHANGE UP (ref 0–0)
OSMOLALITY UR: 473 MOSM/KG — SIGNIFICANT CHANGE UP (ref 300–900)
PHOSPHATE SERPL-MCNC: 5 MG/DL — HIGH (ref 2.5–4.5)
PLATELET # BLD AUTO: 167 K/UL — SIGNIFICANT CHANGE UP (ref 150–400)
POTASSIUM SERPL-MCNC: 4.2 MMOL/L — SIGNIFICANT CHANGE UP (ref 3.5–5.3)
POTASSIUM SERPL-SCNC: 4.2 MMOL/L — SIGNIFICANT CHANGE UP (ref 3.5–5.3)
RBC # BLD: 3.53 M/UL — LOW (ref 3.8–5.2)
RBC # FLD: 13.3 % — SIGNIFICANT CHANGE UP (ref 10.3–14.5)
SODIUM SERPL-SCNC: 136 MMOL/L — SIGNIFICANT CHANGE UP (ref 135–145)
SODIUM UR-SCNC: 37 MMOL/L — SIGNIFICANT CHANGE UP
T PALLIDUM AB TITR SER: NEGATIVE — SIGNIFICANT CHANGE UP
WBC # BLD: 13.55 K/UL — HIGH (ref 3.8–10.5)
WBC # FLD AUTO: 13.55 K/UL — HIGH (ref 3.8–10.5)

## 2023-08-30 RX ORDER — SODIUM CHLORIDE 9 MG/ML
1000 INJECTION, SOLUTION INTRAVENOUS
Refills: 0 | Status: DISCONTINUED | OUTPATIENT
Start: 2023-08-30 | End: 2023-08-31

## 2023-08-30 RX ORDER — ACETAMINOPHEN 500 MG
2 TABLET ORAL
Qty: 0 | Refills: 0 | DISCHARGE
Start: 2023-08-30

## 2023-08-30 RX ORDER — SODIUM CHLORIDE 9 MG/ML
1000 INJECTION, SOLUTION INTRAVENOUS ONCE
Refills: 0 | Status: COMPLETED | OUTPATIENT
Start: 2023-08-30 | End: 2023-08-30

## 2023-08-30 RX ORDER — MAGNESIUM SULFATE 500 MG/ML
2 VIAL (ML) INJECTION ONCE
Refills: 0 | Status: COMPLETED | OUTPATIENT
Start: 2023-08-30 | End: 2023-08-30

## 2023-08-30 RX ORDER — SODIUM CHLORIDE 9 MG/ML
1000 INJECTION, SOLUTION INTRAVENOUS
Refills: 0 | Status: DISCONTINUED | OUTPATIENT
Start: 2023-08-30 | End: 2023-08-30

## 2023-08-30 RX ORDER — HEPARIN SODIUM 5000 [USP'U]/ML
5000 INJECTION INTRAVENOUS; SUBCUTANEOUS EVERY 8 HOURS
Refills: 0 | Status: DISCONTINUED | OUTPATIENT
Start: 2023-08-30 | End: 2023-09-01

## 2023-08-30 RX ADMIN — SIMETHICONE 80 MILLIGRAM(S): 80 TABLET, CHEWABLE ORAL at 06:36

## 2023-08-30 RX ADMIN — Medication 1000 MILLIGRAM(S): at 06:36

## 2023-08-30 RX ADMIN — Medication 4: at 07:41

## 2023-08-30 RX ADMIN — Medication 4: at 17:45

## 2023-08-30 RX ADMIN — HEPARIN SODIUM 5000 UNIT(S): 5000 INJECTION INTRAVENOUS; SUBCUTANEOUS at 22:35

## 2023-08-30 RX ADMIN — SIMETHICONE 80 MILLIGRAM(S): 80 TABLET, CHEWABLE ORAL at 17:50

## 2023-08-30 RX ADMIN — Medication 1000 MILLIGRAM(S): at 17:50

## 2023-08-30 RX ADMIN — SODIUM CHLORIDE 1000 MILLILITER(S): 9 INJECTION, SOLUTION INTRAVENOUS at 18:10

## 2023-08-30 RX ADMIN — Medication 6: at 13:24

## 2023-08-30 RX ADMIN — Medication 25 GRAM(S): at 07:41

## 2023-08-30 RX ADMIN — SIMETHICONE 80 MILLIGRAM(S): 80 TABLET, CHEWABLE ORAL at 13:03

## 2023-08-30 RX ADMIN — HEPARIN SODIUM 5000 UNIT(S): 5000 INJECTION INTRAVENOUS; SUBCUTANEOUS at 13:27

## 2023-08-30 RX ADMIN — Medication 1000 MILLIGRAM(S): at 00:11

## 2023-08-30 RX ADMIN — Medication 1000 MILLIGRAM(S): at 13:03

## 2023-08-30 RX ADMIN — SIMETHICONE 80 MILLIGRAM(S): 80 TABLET, CHEWABLE ORAL at 00:11

## 2023-08-30 NOTE — DISCHARGE NOTE PROVIDER - NSDCFUSCHEDAPPT_GEN_ALL_CORE_FT
Enma Perez  [facility]  GY[facility] [address]  Scheduled Appointment: 09/15/2023

## 2023-08-30 NOTE — DISCHARGE NOTE PROVIDER - HOSPITAL COURSE
71 yo PMH supracervical hysterectomy in 2003 presented to [facility] ED on 8/28 after diagnosis of complex adnexal mass at [facility]. Patient underwent exploratory laparotomy with L ovarian cystectomy on 829, case uncomplicated. Intra-op frozen section was benign. Patient had uncomplicated post-operative course and met all post op milestones - patient was voiding, ambulating, and tolerating regular diet on day of discharge. Patient to follow up with GYN Oncology for post op visit.

## 2023-08-30 NOTE — DISCHARGE NOTE PROVIDER - NSDCCPCAREPLAN_GEN_ALL_CORE_FT
PRINCIPAL DISCHARGE DIAGNOSIS  Diagnosis: Adnexal mass  Assessment and Plan of Treatment:       SECONDARY DISCHARGE DIAGNOSES  Diagnosis: Intractable abdominal pain  Assessment and Plan of Treatment:

## 2023-08-30 NOTE — PROGRESS NOTE ADULT - ASSESSMENT
73 yo  LMP  s/p RUBÉN () for AUB/fibroids presenting with 3 months of LLQ pressure, 2 days of LLQ pain, and new found 19cm complex adnexal mass anow HD2/POD1 s/p ex-lap L cystectomy, LUZ, hernia repair, ureterolysis and cystoscopy w/ brief placement of stent, pelvic washings sent, 1 x bowel oversew, frozen sent in case - benign.    Neuro: Tylenol/Oxy. Holding NSAIDs elevated Cr  CV: HTN, HLD, holding home meds post-op: Atorvastatin 80 QD, Amlodipine 5 QD, HCTZ 12,5, Losartan 100 mg QD (therapeutic exchanges); ASA held   Pulm: 2L NC satting 93-96%  Endo: ISS, A1C 6.2%  FEN/GI: Reg diet, Protonix 40 QAM  GYN: s/p ex-lap, midline vertical incision for L adnexal mass  : Brandt, Is/Os q2h  Heme: H 12.3  DVT ppx: SCDs    A+     Plan: f/u AM CBC/BMP/Mg/Phos , remove brandt and f/u TOV, Lovenox ppx ordered, encourage OOB to chair and incentive spirometer use, will aim to wean pt off of O2 this AM.  Tessie PGY4

## 2023-08-30 NOTE — DISCHARGE NOTE PROVIDER - CARE PROVIDER_API CALL
Enma Perez  Gynecologic Oncology  [address] [address]  Phone: [phone]  Fax: [phone]  Follow Up Time:

## 2023-08-30 NOTE — CHART NOTE - NSCHARTNOTEFT_GEN_A_CORE
Patient assessed at bedside due to using 2L NC overnight to maintain O2 sats >95% (without, satting low 90s). Mongolian  used (language line). Patient denies shortness of breath or any chest pain. Overall pain well controlled, only complaining of a sensation of abdominal bloating. Patient using her incentive spirometer.     Vital Signs Last 24 Hrs  T(C): 36.7 (30 Aug 2023 05:10), Max: 36.7 (30 Aug 2023 05:10)  T(F): 98.1 (30 Aug 2023 05:10), Max: 98.1 (30 Aug 2023 05:10)  HR: 81 (30 Aug 2023 05:10) (51 - 81)  BP: 123/61 (30 Aug 2023 05:10) (91/50 - 125/63)  BP(mean): 76 (29 Aug 2023 18:36) (66 - 77)  RR: 18 (30 Aug 2023 05:10) (12 - 23)  SpO2: 93% (30 Aug 2023 05:10) (93% - 97%)    Parameters below as of 30 Aug 2023 05:10  Patient On (Oxygen Delivery Method): room air    PE  Gen: Alert, NAD  Pulm: Lungs CTAB  CV: Regular rate and rhythm  Ext: Baseline knee pain from osteoarthritis, no calf swelling or tenderness    Plan  - Encourage ambulation and incentive spirometer  - Try to wean off O2 today after more ambulation

## 2023-08-30 NOTE — DISCHARGE NOTE PROVIDER - NSDCFUADDAPPT_GEN_ALL_CORE_FT
You appointment is scheduled for 11am on 9/15/23.  The appointment will be at [address], [address]  [place] [address]  [phone]

## 2023-08-30 NOTE — PROGRESS NOTE ADULT - SUBJECTIVE AND OBJECTIVE BOX
Pt seen and examined at bedside. Pt states mild abdominal pain. Pt not yet ambulating, not yet tolerating regular diet, +flatus, urinating adequately.   Pt denies fever, chills, chest pain, SOB, nausea, vomiting, lightheadedness, dizziness.      T(F): 98.1 (08-30-23 @ 05:10), Max: 98.1 (08-30-23 @ 05:10)  HR: 81 (08-30-23 @ 05:10) (51 - 81)  BP: 123/61 (08-30-23 @ 05:10) (91/50 - 125/63)  RR: 18 (08-30-23 @ 05:10) (12 - 23)  SpO2: 93% (08-30-23 @ 05:10) (93% - 97%)    I&O's Summary  29 Aug 2023 07:01  -  30 Aug 2023 07:00  --------------------------------------------------------  IN: 0 mL / OUT: 575 mL / NET: -575 mL    MEDICATIONS  (STANDING):  acetaminophen     Tablet .. 1000 milliGRAM(s) Oral every 6 hours  dextrose 5%. 1000 milliLiter(s) (100 mL/Hr) IV Continuous <Continuous>  dextrose 5%. 1000 milliLiter(s) (50 mL/Hr) IV Continuous <Continuous>  dextrose 50% Injectable 25 Gram(s) IV Push once  dextrose 50% Injectable 12.5 Gram(s) IV Push once  dextrose 50% Injectable 25 Gram(s) IV Push once  glucagon  Injectable 1 milliGRAM(s) IntraMuscular once  insulin lispro (ADMELOG) corrective regimen sliding scale   SubCutaneous three times a day before meals  simethicone 80 milliGRAM(s) Chew every 6 hours    MEDICATIONS  (PRN):  dextrose Oral Gel 15 Gram(s) Oral once PRN Blood Glucose LESS THAN 70 milliGRAM(s)/deciliter  HYDROmorphone  Injectable 0.5 milliGRAM(s) IV Push every 15 minutes PRN breakthrough pain in the PACU  metoclopramide Injectable 10 milliGRAM(s) IV Push every 6 hours PRN 2nd line for N/V  ondansetron Injectable 8 milliGRAM(s) IV Push every 6 hours PRN 1st line for N/V  oxyCODONE    IR 5 milliGRAM(s) Oral every 6 hours PRN Moderate Pain (4 - 6)  oxyCODONE    IR 10 milliGRAM(s) Oral every 6 hours PRN Severe Pain (7 - 10)      Physical Exam:  Constitutional: NAD  Pulmonary: clear to auscultation bilaterally, no wheezes/rales, mildly decreased breath sounds at the lower lobes bilaterally  Abdomen: midline incision site clean, dry, intact. Soft, mildly tender to deep palpation, moderately distended, no guarding, no rebound, +bowel sounds  Extremities: no lower extremity edema or calf tenderness. SCDs in place.    LABS:                        10.4   13.55 )-----------( 167      ( 30 Aug 2023 05:30 )             32.0     08-30    136  |  102  |  37<H>  ----------------------------<  227<H>  4.2   |  23  |  1.73<H>    Ca    8.6      30 Aug 2023 05:30  Phos  5.0     08-30  Mg     1.7     08-30      PT/INR - ( 29 Aug 2023 09:07 )   PT: 11.3 sec;   INR: 0.99          PTT - ( 29 Aug 2023 10:32 )  PTT:30.0 sec  Urinalysis Basic - ( 30 Aug 2023 05:30 )    Color: x / Appearance: x / SG: x / pH: x  Gluc: 227 mg/dL / Ketone: x  / Bili: x / Urobili: x   Blood: x / Protein: x / Nitrite: x   Leuk Esterase: x / RBC: x / WBC x   Sq Epi: x / Non Sq Epi: x / Bacteria: x        RADIOLOGY & ADDITIONAL TESTS:

## 2023-08-31 LAB
ANION GAP SERPL CALC-SCNC: 9 MMOL/L — SIGNIFICANT CHANGE UP (ref 5–17)
BASE EXCESS BLDA CALC-SCNC: SIGNIFICANT CHANGE UP MMOL/L (ref -2–3)
BASOPHILS # BLD AUTO: 0.02 K/UL — SIGNIFICANT CHANGE UP (ref 0–0.2)
BASOPHILS NFR BLD AUTO: 0.2 % — SIGNIFICANT CHANGE UP (ref 0–2)
BLD GP AB SCN SERPL QL: NEGATIVE — SIGNIFICANT CHANGE UP
BUN SERPL-MCNC: 41 MG/DL — HIGH (ref 7–23)
CALCIUM SERPL-MCNC: 8.9 MG/DL — SIGNIFICANT CHANGE UP (ref 8.4–10.5)
CHLORIDE SERPL-SCNC: 105 MMOL/L — SIGNIFICANT CHANGE UP (ref 96–108)
CO2 BLDA-SCNC: SIGNIFICANT CHANGE UP MMOL/L (ref 19–24)
CO2 SERPL-SCNC: 25 MMOL/L — SIGNIFICANT CHANGE UP (ref 22–31)
CREAT SERPL-MCNC: 1.71 MG/DL — HIGH (ref 0.5–1.3)
EGFR: 32 ML/MIN/1.73M2 — LOW
EOSINOPHIL # BLD AUTO: 0.09 K/UL — SIGNIFICANT CHANGE UP (ref 0–0.5)
EOSINOPHIL NFR BLD AUTO: 0.9 % — SIGNIFICANT CHANGE UP (ref 0–6)
GAS PNL BLDA: SIGNIFICANT CHANGE UP
GLUCOSE BLDC GLUCOMTR-MCNC: 114 MG/DL — HIGH (ref 70–99)
GLUCOSE BLDC GLUCOMTR-MCNC: 176 MG/DL — HIGH (ref 70–99)
GLUCOSE BLDC GLUCOMTR-MCNC: 176 MG/DL — HIGH (ref 70–99)
GLUCOSE BLDC GLUCOMTR-MCNC: 187 MG/DL — HIGH (ref 70–99)
GLUCOSE SERPL-MCNC: 169 MG/DL — HIGH (ref 70–99)
HCO3 BLDA-SCNC: SIGNIFICANT CHANGE UP MMOL/L (ref 21–28)
HCT VFR BLD CALC: 30 % — LOW (ref 34.5–45)
HGB BLD-MCNC: 9.5 G/DL — LOW (ref 11.5–15.5)
IMM GRANULOCYTES NFR BLD AUTO: 0.5 % — SIGNIFICANT CHANGE UP (ref 0–0.9)
LYMPHOCYTES # BLD AUTO: 2.33 K/UL — SIGNIFICANT CHANGE UP (ref 1–3.3)
LYMPHOCYTES # BLD AUTO: 23.8 % — SIGNIFICANT CHANGE UP (ref 13–44)
MAGNESIUM SERPL-MCNC: 2.4 MG/DL — SIGNIFICANT CHANGE UP (ref 1.6–2.6)
MCHC RBC-ENTMCNC: 29.5 PG — SIGNIFICANT CHANGE UP (ref 27–34)
MCHC RBC-ENTMCNC: 31.7 GM/DL — LOW (ref 32–36)
MCV RBC AUTO: 93.2 FL — SIGNIFICANT CHANGE UP (ref 80–100)
MONOCYTES # BLD AUTO: 0.58 K/UL — SIGNIFICANT CHANGE UP (ref 0–0.9)
MONOCYTES NFR BLD AUTO: 5.9 % — SIGNIFICANT CHANGE UP (ref 2–14)
NEUTROPHILS # BLD AUTO: 6.7 K/UL — SIGNIFICANT CHANGE UP (ref 1.8–7.4)
NEUTROPHILS NFR BLD AUTO: 68.7 % — SIGNIFICANT CHANGE UP (ref 43–77)
NRBC # BLD: 0 /100 WBCS — SIGNIFICANT CHANGE UP (ref 0–0)
PCO2 BLDA: SIGNIFICANT CHANGE UP MMHG (ref 32–45)
PH BLDA: SIGNIFICANT CHANGE UP (ref 7.35–7.45)
PHOSPHATE SERPL-MCNC: 2.7 MG/DL — SIGNIFICANT CHANGE UP (ref 2.5–4.5)
PLATELET # BLD AUTO: 156 K/UL — SIGNIFICANT CHANGE UP (ref 150–400)
PO2 BLDA: SIGNIFICANT CHANGE UP MMHG (ref 83–108)
POTASSIUM SERPL-MCNC: 4.1 MMOL/L — SIGNIFICANT CHANGE UP (ref 3.5–5.3)
POTASSIUM SERPL-SCNC: 4.1 MMOL/L — SIGNIFICANT CHANGE UP (ref 3.5–5.3)
RBC # BLD: 3.22 M/UL — LOW (ref 3.8–5.2)
RBC # FLD: 14.1 % — SIGNIFICANT CHANGE UP (ref 10.3–14.5)
RH IG SCN BLD-IMP: POSITIVE — SIGNIFICANT CHANGE UP
SAO2 % BLDA: SIGNIFICANT CHANGE UP % (ref 94–98)
SODIUM SERPL-SCNC: 139 MMOL/L — SIGNIFICANT CHANGE UP (ref 135–145)
WBC # BLD: 9.77 K/UL — SIGNIFICANT CHANGE UP (ref 3.8–10.5)
WBC # FLD AUTO: 9.77 K/UL — SIGNIFICANT CHANGE UP (ref 3.8–10.5)

## 2023-08-31 PROCEDURE — 71045 X-RAY EXAM CHEST 1 VIEW: CPT | Mod: 26

## 2023-08-31 RX ORDER — PANTOPRAZOLE SODIUM 20 MG/1
40 TABLET, DELAYED RELEASE ORAL
Refills: 0 | Status: DISCONTINUED | OUTPATIENT
Start: 2023-08-31 | End: 2023-09-01

## 2023-08-31 RX ORDER — SODIUM,POTASSIUM PHOSPHATES 278-250MG
1 POWDER IN PACKET (EA) ORAL ONCE
Refills: 0 | Status: COMPLETED | OUTPATIENT
Start: 2023-08-31 | End: 2023-08-31

## 2023-08-31 RX ADMIN — Medication 1000 MILLIGRAM(S): at 00:22

## 2023-08-31 RX ADMIN — SIMETHICONE 80 MILLIGRAM(S): 80 TABLET, CHEWABLE ORAL at 00:22

## 2023-08-31 RX ADMIN — SIMETHICONE 80 MILLIGRAM(S): 80 TABLET, CHEWABLE ORAL at 18:30

## 2023-08-31 RX ADMIN — Medication 1000 MILLIGRAM(S): at 06:07

## 2023-08-31 RX ADMIN — Medication 1000 MILLIGRAM(S): at 18:30

## 2023-08-31 RX ADMIN — HEPARIN SODIUM 5000 UNIT(S): 5000 INJECTION INTRAVENOUS; SUBCUTANEOUS at 06:07

## 2023-08-31 RX ADMIN — SIMETHICONE 80 MILLIGRAM(S): 80 TABLET, CHEWABLE ORAL at 06:07

## 2023-08-31 RX ADMIN — Medication 1 TABLET(S): at 13:42

## 2023-08-31 RX ADMIN — Medication 2: at 21:55

## 2023-08-31 RX ADMIN — HEPARIN SODIUM 5000 UNIT(S): 5000 INJECTION INTRAVENOUS; SUBCUTANEOUS at 13:43

## 2023-08-31 RX ADMIN — Medication 2: at 07:19

## 2023-08-31 RX ADMIN — Medication 2: at 12:26

## 2023-08-31 RX ADMIN — SODIUM CHLORIDE 125 MILLILITER(S): 9 INJECTION, SOLUTION INTRAVENOUS at 00:22

## 2023-08-31 RX ADMIN — Medication 1000 MILLIGRAM(S): at 12:26

## 2023-08-31 RX ADMIN — SIMETHICONE 80 MILLIGRAM(S): 80 TABLET, CHEWABLE ORAL at 12:26

## 2023-08-31 RX ADMIN — HEPARIN SODIUM 5000 UNIT(S): 5000 INJECTION INTRAVENOUS; SUBCUTANEOUS at 22:27

## 2023-08-31 RX ADMIN — PANTOPRAZOLE SODIUM 40 MILLIGRAM(S): 20 TABLET, DELAYED RELEASE ORAL at 07:19

## 2023-08-31 NOTE — PHYSICAL THERAPY INITIAL EVALUATION ADULT - DID THE PATIENT HAVE SURGERY?
Exploratory laparotomy, Laparotomy, exploratory, with pelvic mass excision and salpingo-oophorectomy, Cystoscopy, Insertion, ureteral stent/yes

## 2023-08-31 NOTE — PROGRESS NOTE ADULT - SUBJECTIVE AND OBJECTIVE BOX
Pt seen and examined at bedside. Pt states only mild abdominal pain. Pt ambulating, tolerating regular diet, -flatus, urinating adequately after being slightly oliguric.   Pt denies fever, chills, chest pain, SOB, nausea, vomiting, lightheadedness, dizziness.      T(F): 98.1 (08-30-23 @ 05:10), Max: 98.1 (08-30-23 @ 05:10)  HR: 81 (08-30-23 @ 05:10) (51 - 81)  BP: 123/61 (08-30-23 @ 05:10) (91/50 - 125/63)  RR: 18 (08-30-23 @ 05:10) (12 - 23)  SpO2: 93% (08-30-23 @ 05:10) (93% - 97%)    I&O's Summary  29 Aug 2023 07:01  -  30 Aug 2023 07:00  --------------------------------------------------------  IN: 0 mL / OUT: 575 mL / NET: -575 mL    MEDICATIONS  (STANDING):  acetaminophen     Tablet .. 1000 milliGRAM(s) Oral every 6 hours  dextrose 5%. 1000 milliLiter(s) (100 mL/Hr) IV Continuous <Continuous>  dextrose 5%. 1000 milliLiter(s) (50 mL/Hr) IV Continuous <Continuous>  dextrose 50% Injectable 25 Gram(s) IV Push once  dextrose 50% Injectable 12.5 Gram(s) IV Push once  dextrose 50% Injectable 25 Gram(s) IV Push once  glucagon  Injectable 1 milliGRAM(s) IntraMuscular once  insulin lispro (ADMELOG) corrective regimen sliding scale   SubCutaneous three times a day before meals  simethicone 80 milliGRAM(s) Chew every 6 hours    MEDICATIONS  (PRN):  dextrose Oral Gel 15 Gram(s) Oral once PRN Blood Glucose LESS THAN 70 milliGRAM(s)/deciliter  HYDROmorphone  Injectable 0.5 milliGRAM(s) IV Push every 15 minutes PRN breakthrough pain in the PACU  metoclopramide Injectable 10 milliGRAM(s) IV Push every 6 hours PRN 2nd line for N/V  ondansetron Injectable 8 milliGRAM(s) IV Push every 6 hours PRN 1st line for N/V  oxyCODONE    IR 5 milliGRAM(s) Oral every 6 hours PRN Moderate Pain (4 - 6)  oxyCODONE    IR 10 milliGRAM(s) Oral every 6 hours PRN Severe Pain (7 - 10)      Physical Exam:  Constitutional: NAD  Pulmonary: clear to auscultation bilaterally, no wheezes/rales, mildly decreased breath sounds at the lower lobes bilaterally  Abdomen: midline incision site clean, dry, intact. Soft, mildly tender to deep palpation, moderately distended, no guarding, no rebound, +bowel sounds  Extremities: no lower extremity edema or calf tenderness. SCDs in place.    LABS:                        10.4   13.55 )-----------( 167      ( 30 Aug 2023 05:30 )             32.0     08-30    136  |  102  |  37<H>  ----------------------------<  227<H>  4.2   |  23  |  1.73<H>    Ca    8.6      30 Aug 2023 05:30  Phos  5.0     08-30  Mg     1.7     08-30      PT/INR - ( 29 Aug 2023 09:07 )   PT: 11.3 sec;   INR: 0.99          PTT - ( 29 Aug 2023 10:32 )  PTT:30.0 sec  Urinalysis Basic - ( 30 Aug 2023 05:30 )    Color: x / Appearance: x / SG: x / pH: x  Gluc: 227 mg/dL / Ketone: x  / Bili: x / Urobili: x   Blood: x / Protein: x / Nitrite: x   Leuk Esterase: x / RBC: x / WBC x   Sq Epi: x / Non Sq Epi: x / Bacteria: x        RADIOLOGY & ADDITIONAL TESTS:     [facility] used: [id number]    Pt seen and examined at bedside. Pt states only mild abdominal pain. Pt ambulating, tolerating regular diet, -flatus, urinating adequately after being slightly oliguric.   Pt denies fever, chills, chest pain, SOB, nausea, vomiting, lightheadedness, dizziness.      T(F): 98.1 (08-30-23 @ 05:10), Max: 98.1 (08-30-23 @ 05:10)  HR: 81 (08-30-23 @ 05:10) (51 - 81)  BP: 123/61 (08-30-23 @ 05:10) (91/50 - 125/63)  RR: 18 (08-30-23 @ 05:10) (12 - 23)  SpO2: 93% (08-30-23 @ 05:10) (93% - 97%)    I&O's Summary  29 Aug 2023 07:01  -  30 Aug 2023 07:00  --------------------------------------------------------  IN: 0 mL / OUT: 575 mL / NET: -575 mL    MEDICATIONS  (STANDING):  acetaminophen     Tablet .. 1000 milliGRAM(s) Oral every 6 hours  dextrose 5%. 1000 milliLiter(s) (100 mL/Hr) IV Continuous <Continuous>  dextrose 5%. 1000 milliLiter(s) (50 mL/Hr) IV Continuous <Continuous>  dextrose 50% Injectable 25 Gram(s) IV Push once  dextrose 50% Injectable 12.5 Gram(s) IV Push once  dextrose 50% Injectable 25 Gram(s) IV Push once  glucagon  Injectable 1 milliGRAM(s) IntraMuscular once  insulin lispro (ADMELOG) corrective regimen sliding scale   SubCutaneous three times a day before meals  simethicone 80 milliGRAM(s) Chew every 6 hours    MEDICATIONS  (PRN):  dextrose Oral Gel 15 Gram(s) Oral once PRN Blood Glucose LESS THAN 70 milliGRAM(s)/deciliter  HYDROmorphone  Injectable 0.5 milliGRAM(s) IV Push every 15 minutes PRN breakthrough pain in the PACU  metoclopramide Injectable 10 milliGRAM(s) IV Push every 6 hours PRN 2nd line for N/V  ondansetron Injectable 8 milliGRAM(s) IV Push every 6 hours PRN 1st line for N/V  oxyCODONE    IR 5 milliGRAM(s) Oral every 6 hours PRN Moderate Pain (4 - 6)  oxyCODONE    IR 10 milliGRAM(s) Oral every 6 hours PRN Severe Pain (7 - 10)      Physical Exam:  Constitutional: NAD  Pulmonary: no incr. WOB  Abdomen: midline incision site clean, dry, intact. now pubic incisional area stuck - ABD placed, Soft, mildly tender to deep palpation, moderately distended, no guarding, no rebound, +bowel sounds  Extremities: no lower extremity edema or calf tenderness. SCDs in place.    LABS:                        10.4   13.55 )-----------( 167      ( 30 Aug 2023 05:30 )             32.0     08-30    136  |  102  |  37<H>  ----------------------------<  227<H>  4.2   |  23  |  1.73<H>    Ca    8.6      30 Aug 2023 05:30  Phos  5.0     08-30  Mg     1.7     08-30      PT/INR - ( 29 Aug 2023 09:07 )   PT: 11.3 sec;   INR: 0.99          PTT - ( 29 Aug 2023 10:32 )  PTT:30.0 sec  Urinalysis Basic - ( 30 Aug 2023 05:30 )    Color: x / Appearance: x / SG: x / pH: x  Gluc: 227 mg/dL / Ketone: x  / Bili: x / Urobili: x   Blood: x / Protein: x / Nitrite: x   Leuk Esterase: x / RBC: x / WBC x   Sq Epi: x / Non Sq Epi: x / Bacteria: x        RADIOLOGY & ADDITIONAL TESTS:

## 2023-08-31 NOTE — PHYSICAL THERAPY INITIAL EVALUATION ADULT - PERTINENT HX OF CURRENT PROBLEM, REHAB EVAL
71 yo  PMH T2DM, HTN, HLD, Osteoarthritis presenting to [facility] after 1 night admission at [facility] for LLQ pain for which she was found to have complex L adnexal mass. Patient reports that she has been feeling L sided abdominal pressure/fullness x3 months, and for the last 2 days had new onset LLQ abdominal pain and constipation. The abdominal pain came on gradually and at its peak was 6/10, described as "pressure". Patient denies dysuria (at baseline has mild urinary incontinence), diarrhea, chest pain, shortness of breath. Denies unintentional weight loss, vaginal bleeding. Her last bowel movement was yesterday, prior to that 2 days before. 19cm complex adnexal mass diagnosed on CT at [facility]; this is the first time the patient has been made aware of this mass. Of note, patient lives in [place], is visiting her son in the [place] for 2 months.

## 2023-08-31 NOTE — PROGRESS NOTE ADULT - ASSESSMENT
71 yo  LMP  s/p RUBÉN () for AUB/fibroids presenting with 3 months of LLQ pressure, 2 days of LLQ pain, and new found 19cm complex adnexal mass anow HD2/POD1 s/p ex-lap L cystectomy, LUZ, hernia repair, ureterolysis and cystoscopy w/ brief placement of stent, pelvic washings sent, 1 x bowel oversew, frozen sent in case - benign. POD2    Neuro: Tylenol/Oxy. Holding NSAIDs elevated Cr.  CV: HTN, HLD, holding home meds post-op: Atorvastatin 80 QD, Amlodipine 5 QD, HCTZ 12,5, Losartan 100 mg QD (therapeutic exchanges); ASA held   Pulm: 2L NC satting 98% - Wean to room air  Endo: ISS, A1C 6.2%  FEN/GI: Reg diet, Protonix 40 QAM, Simethicone ATC  GYN: s/p ex-lap, midline vertical incision for L adnexal mass  : Voiding, s/p oliguria  s/p 1L IV bolus + maintenance fluid  Heme: H 12.3  DVT ppx: SCDs    A+     Plan: f/u AM CBC/BMP/Mg/Phos , encourage ambulation and incentive spirometer use, wean pt off of O2 this AM, await flatus, simethicone ATC  Tessie PGY4   73 yo  LMP  s/p RUBÉN () for AUB/fibroids presenting with 3 months of LLQ pressure, 2 days of LLQ pain, and new found 19cm complex adnexal mass anow HD2/POD1 s/p ex-lap L cystectomy, LUZ, hernia repair, ureterolysis and cystoscopy w/ brief placement of stent, pelvic washings sent, 1 x bowel oversew, frozen sent in case - benign. POD2    Neuro: Tylenol/Oxy. Holding NSAIDs elevated Cr.  CV: HTN, HLD, holding home meds post-op: Atorvastatin 80 QD, Amlodipine 5 QD, HCTZ 12,5, Losartan 100 mg QD (therapeutic exchanges); ASA held   Pulm: 2L NC satting 94%  Endo: ISS, A1C 6.2%  FEN/GI: Reg diet, Protonix 40 QAM, Simethicone ATC  GYN: s/p ex-lap, midline vertical incision for L adnexal mass  : Voiding, s/p oliguria  s/p 1L IV bolus + maintenance fluid  Heme: H 12.3  DVT ppx: SCDs    A+     Plan: f/u AM CBC/BMP/Mg/Phos , encourage ambulation and incentive spirometer use, wean pt off of O2 this AM, await flatus, simethicone ATC  Tessie PGY4

## 2023-09-01 ENCOUNTER — TRANSCRIPTION ENCOUNTER (OUTPATIENT)
Age: 73
End: 2023-09-01

## 2023-09-01 VITALS
TEMPERATURE: 98 F | SYSTOLIC BLOOD PRESSURE: 142 MMHG | RESPIRATION RATE: 17 BRPM | DIASTOLIC BLOOD PRESSURE: 81 MMHG | HEART RATE: 78 BPM | OXYGEN SATURATION: 93 %

## 2023-09-01 LAB
ALBUMIN SERPL ELPH-MCNC: 3.2 G/DL — LOW (ref 3.3–5)
ALP SERPL-CCNC: 66 U/L — SIGNIFICANT CHANGE UP (ref 40–120)
ALT FLD-CCNC: 23 U/L — SIGNIFICANT CHANGE UP (ref 10–45)
ANION GAP SERPL CALC-SCNC: 8 MMOL/L — SIGNIFICANT CHANGE UP (ref 5–17)
AST SERPL-CCNC: 21 U/L — SIGNIFICANT CHANGE UP (ref 10–40)
BASOPHILS # BLD AUTO: 0.01 K/UL — SIGNIFICANT CHANGE UP (ref 0–0.2)
BASOPHILS NFR BLD AUTO: 0.2 % — SIGNIFICANT CHANGE UP (ref 0–2)
BILIRUB DIRECT SERPL-MCNC: <0.2 MG/DL — SIGNIFICANT CHANGE UP (ref 0–0.3)
BILIRUB INDIRECT FLD-MCNC: SIGNIFICANT CHANGE UP MG/DL (ref 0.2–1)
BILIRUB SERPL-MCNC: 0.4 MG/DL — SIGNIFICANT CHANGE UP (ref 0.2–1.2)
BUN SERPL-MCNC: 34 MG/DL — HIGH (ref 7–23)
CALCIUM SERPL-MCNC: 8.7 MG/DL — SIGNIFICANT CHANGE UP (ref 8.4–10.5)
CHLORIDE SERPL-SCNC: 106 MMOL/L — SIGNIFICANT CHANGE UP (ref 96–108)
CO2 SERPL-SCNC: 27 MMOL/L — SIGNIFICANT CHANGE UP (ref 22–31)
CREAT SERPL-MCNC: 1.6 MG/DL — HIGH (ref 0.5–1.3)
EGFR: 34 ML/MIN/1.73M2 — LOW
EOSINOPHIL # BLD AUTO: 0.24 K/UL — SIGNIFICANT CHANGE UP (ref 0–0.5)
EOSINOPHIL NFR BLD AUTO: 3.7 % — SIGNIFICANT CHANGE UP (ref 0–6)
GLUCOSE BLDC GLUCOMTR-MCNC: 108 MG/DL — HIGH (ref 70–99)
GLUCOSE BLDC GLUCOMTR-MCNC: 119 MG/DL — HIGH (ref 70–99)
GLUCOSE BLDC GLUCOMTR-MCNC: 189 MG/DL — HIGH (ref 70–99)
GLUCOSE SERPL-MCNC: 115 MG/DL — HIGH (ref 70–99)
HCT VFR BLD CALC: 31.4 % — LOW (ref 34.5–45)
HGB BLD-MCNC: 9.5 G/DL — LOW (ref 11.5–15.5)
IMM GRANULOCYTES NFR BLD AUTO: 0.6 % — SIGNIFICANT CHANGE UP (ref 0–0.9)
LYMPHOCYTES # BLD AUTO: 2.41 K/UL — SIGNIFICANT CHANGE UP (ref 1–3.3)
LYMPHOCYTES # BLD AUTO: 37 % — SIGNIFICANT CHANGE UP (ref 13–44)
MAGNESIUM SERPL-MCNC: 2.1 MG/DL — SIGNIFICANT CHANGE UP (ref 1.6–2.6)
MCHC RBC-ENTMCNC: 29.1 PG — SIGNIFICANT CHANGE UP (ref 27–34)
MCHC RBC-ENTMCNC: 30.3 GM/DL — LOW (ref 32–36)
MCV RBC AUTO: 96 FL — SIGNIFICANT CHANGE UP (ref 80–100)
MONOCYTES # BLD AUTO: 0.45 K/UL — SIGNIFICANT CHANGE UP (ref 0–0.9)
MONOCYTES NFR BLD AUTO: 6.9 % — SIGNIFICANT CHANGE UP (ref 2–14)
NEUTROPHILS # BLD AUTO: 3.36 K/UL — SIGNIFICANT CHANGE UP (ref 1.8–7.4)
NEUTROPHILS NFR BLD AUTO: 51.6 % — SIGNIFICANT CHANGE UP (ref 43–77)
NON-GYNECOLOGICAL CYTOLOGY STUDY: SIGNIFICANT CHANGE UP
NRBC # BLD: 0 /100 WBCS — SIGNIFICANT CHANGE UP (ref 0–0)
PHOSPHATE SERPL-MCNC: 2.6 MG/DL — SIGNIFICANT CHANGE UP (ref 2.5–4.5)
PLATELET # BLD AUTO: 179 K/UL — SIGNIFICANT CHANGE UP (ref 150–400)
POTASSIUM SERPL-MCNC: 4.4 MMOL/L — SIGNIFICANT CHANGE UP (ref 3.5–5.3)
POTASSIUM SERPL-SCNC: 4.4 MMOL/L — SIGNIFICANT CHANGE UP (ref 3.5–5.3)
PROT SERPL-MCNC: 6.5 G/DL — SIGNIFICANT CHANGE UP (ref 6–8.3)
RBC # BLD: 3.27 M/UL — LOW (ref 3.8–5.2)
RBC # FLD: 14.1 % — SIGNIFICANT CHANGE UP (ref 10.3–14.5)
SODIUM SERPL-SCNC: 141 MMOL/L — SIGNIFICANT CHANGE UP (ref 135–145)
WBC # BLD: 6.51 K/UL — SIGNIFICANT CHANGE UP (ref 3.8–10.5)
WBC # FLD AUTO: 6.51 K/UL — SIGNIFICANT CHANGE UP (ref 3.8–10.5)

## 2023-09-01 RX ORDER — SODIUM,POTASSIUM PHOSPHATES 278-250MG
2 POWDER IN PACKET (EA) ORAL ONCE
Refills: 0 | Status: COMPLETED | OUTPATIENT
Start: 2023-09-01 | End: 2023-09-01

## 2023-09-01 RX ADMIN — HEPARIN SODIUM 5000 UNIT(S): 5000 INJECTION INTRAVENOUS; SUBCUTANEOUS at 14:40

## 2023-09-01 RX ADMIN — Medication 2: at 12:17

## 2023-09-01 RX ADMIN — Medication 1000 MILLIGRAM(S): at 00:06

## 2023-09-01 RX ADMIN — SIMETHICONE 80 MILLIGRAM(S): 80 TABLET, CHEWABLE ORAL at 18:28

## 2023-09-01 RX ADMIN — PANTOPRAZOLE SODIUM 40 MILLIGRAM(S): 20 TABLET, DELAYED RELEASE ORAL at 06:00

## 2023-09-01 RX ADMIN — HEPARIN SODIUM 5000 UNIT(S): 5000 INJECTION INTRAVENOUS; SUBCUTANEOUS at 06:01

## 2023-09-01 RX ADMIN — SIMETHICONE 80 MILLIGRAM(S): 80 TABLET, CHEWABLE ORAL at 06:01

## 2023-09-01 RX ADMIN — Medication 1000 MILLIGRAM(S): at 01:06

## 2023-09-01 RX ADMIN — SIMETHICONE 80 MILLIGRAM(S): 80 TABLET, CHEWABLE ORAL at 00:06

## 2023-09-01 RX ADMIN — SIMETHICONE 80 MILLIGRAM(S): 80 TABLET, CHEWABLE ORAL at 12:12

## 2023-09-01 RX ADMIN — Medication 1000 MILLIGRAM(S): at 18:28

## 2023-09-01 RX ADMIN — Medication 2 TABLET(S): at 12:12

## 2023-09-01 RX ADMIN — Medication 1000 MILLIGRAM(S): at 06:00

## 2023-09-01 RX ADMIN — Medication 1000 MILLIGRAM(S): at 07:00

## 2023-09-01 RX ADMIN — Medication 1000 MILLIGRAM(S): at 12:12

## 2023-09-01 NOTE — DISCHARGE NOTE NURSING/CASE MANAGEMENT/SOCIAL WORK - NSDCFUADDAPPT_GEN_ALL_CORE_FT
You appointment is scheduled for 11am on 9/15/23.  The appointment will be at [address], [address]  [place] [address]  [phone]

## 2023-09-01 NOTE — DISCHARGE NOTE NURSING/CASE MANAGEMENT/SOCIAL WORK - PATIENT PORTAL LINK FT
You can access the FollowMyHealth Patient Portal offered by [facility] by registering at the following website: [email or web address]. By joining [employer]’s FollowMyHealth portal, you will also be able to view your health information using other applications (apps) compatible with our system.

## 2023-09-01 NOTE — PROGRESS NOTE ADULT - SUBJECTIVE AND OBJECTIVE BOX
Pt seen and examined at bedside. Pt states she is feeling well with no abdominal pain. Pt ambulating, tolerating regular diet, +flatus, +bm   Pt denies fever, chills, chest pain, SOB, nausea, vomiting, lightheadedness, dizziness.      Vital Signs Last 24 Hrs  T(C): 36.6 (01 Sep 2023 05:00), Max: 36.9 (31 Aug 2023 21:00)  T(F): 97.8 (01 Sep 2023 05:00), Max: 98.4 (31 Aug 2023 21:00)  HR: 76 (01 Sep 2023 05:00) (59 - 76)  BP: 137/70 (01 Sep 2023 05:00) (117/65 - 147/81)  RR: 16 (01 Sep 2023 05:00) (16 - 20)  SpO2: 95% (01 Sep 2023 05:00) (93% - 97%)    Parameters below as of 31 Aug 2023 21:00  Patient On (Oxygen Delivery Method): room air    MEDICATIONS  (STANDING):  acetaminophen     Tablet .. 1000 milliGRAM(s) Oral every 6 hours  dextrose 5%. 1000 milliLiter(s) (100 mL/Hr) IV Continuous <Continuous>  dextrose 5%. 1000 milliLiter(s) (50 mL/Hr) IV Continuous <Continuous>  dextrose 50% Injectable 25 Gram(s) IV Push once  dextrose 50% Injectable 12.5 Gram(s) IV Push once  dextrose 50% Injectable 25 Gram(s) IV Push once  glucagon  Injectable 1 milliGRAM(s) IntraMuscular once  insulin lispro (ADMELOG) corrective regimen sliding scale   SubCutaneous three times a day before meals  simethicone 80 milliGRAM(s) Chew every 6 hours    MEDICATIONS  (PRN):  dextrose Oral Gel 15 Gram(s) Oral once PRN Blood Glucose LESS THAN 70 milliGRAM(s)/deciliter  HYDROmorphone  Injectable 0.5 milliGRAM(s) IV Push every 15 minutes PRN breakthrough pain in the PACU  metoclopramide Injectable 10 milliGRAM(s) IV Push every 6 hours PRN 2nd line for N/V  ondansetron Injectable 8 milliGRAM(s) IV Push every 6 hours PRN 1st line for N/V  oxyCODONE    IR 5 milliGRAM(s) Oral every 6 hours PRN Moderate Pain (4 - 6)  oxyCODONE    IR 10 milliGRAM(s) Oral every 6 hours PRN Severe Pain (7 - 10)      Physical Exam:  Constitutional: NAD  Pulmonary: no incr. WOB  Abdomen: midline incision site clean, dry, intact.Soft, mildly tender to deep palpation, moderately distended, no guarding, no rebound, +bowel sounds  Extremities: no lower extremity edema or calf tenderness. SCDs in place.    LABS:                        10.4   13.55 )-----------( 167      ( 30 Aug 2023 05:30 )             32.0     08-30    136  |  102  |  37<H>  ----------------------------<  227<H>  4.2   |  23  |  1.73<H>    Ca    8.6      30 Aug 2023 05:30  Phos  5.0     08-30  Mg     1.7     08-30      PT/INR - ( 29 Aug 2023 09:07 )   PT: 11.3 sec;   INR: 0.99          PTT - ( 29 Aug 2023 10:32 )  PTT:30.0 sec  Urinalysis Basic - ( 30 Aug 2023 05:30 )    Color: x / Appearance: x / SG: x / pH: x  Gluc: 227 mg/dL / Ketone: x  / Bili: x / Urobili: x   Blood: x / Protein: x / Nitrite: x   Leuk Esterase: x / RBC: x / WBC x   Sq Epi: x / Non Sq Epi: x / Bacteria: x        RADIOLOGY & ADDITIONAL TESTS:

## 2023-09-01 NOTE — PROGRESS NOTE ADULT - ASSESSMENT
73 yo  LMP  s/p RUBÉN () for AUB/fibroids presenting with 3 months of LLQ pressure, 2 days of LLQ pain, and new found 19cm complex adnexal mass anow HD2/POD1 s/p ex-lap L cystectomy, LUZ, hernia repair, ureterolysis and cystoscopy w/ brief placement of stent, pelvic washings sent, 1 x bowel oversew, frozen sent in case - benign. POD3    Neuro: Tylenol/Oxy. Holding NSAIDs elevated Cr.  CV: HTN, HLD, holding home meds post-op: Atorvastatin 80 QD, Amlodipine 5 QD, HCTZ 12,5, Losartan 100 mg QD (therapeutic exchanges); ASA held   Pulm: 94% ORA  Endo: ISS, A1C 6.2%  FEN/GI: Reg diet, Protonix 40 QAM, Simethicone ATC  GYN: s/p ex-lap, midline vertical incision for L adnexal mass  : Voiding, s/p oliguria  which resolved with 1L IV bolus + maintenance fluid  Heme: H 12.3>10.4  DVT ppx: SCDs    A+     Plan: f/u AM CBC/BMP/Mg/Phos , encourage ambulation and incentive spirometer use. Meeting all milestones. If stable, OK for dc home with strict return precautions.  Tessie PGY4

## 2023-09-05 PROBLEM — Z00.00 ENCOUNTER FOR PREVENTIVE HEALTH EXAMINATION: Status: ACTIVE | Noted: 2023-09-05

## 2023-09-09 LAB — SURGICAL PATHOLOGY STUDY: SIGNIFICANT CHANGE UP

## 2023-09-12 DIAGNOSIS — E78.5 HYPERLIPIDEMIA, UNSPECIFIED: ICD-10-CM

## 2023-09-12 DIAGNOSIS — R10.32 LEFT LOWER QUADRANT PAIN: ICD-10-CM

## 2023-09-12 DIAGNOSIS — I10 ESSENTIAL (PRIMARY) HYPERTENSION: ICD-10-CM

## 2023-09-12 DIAGNOSIS — D36.7 BENIGN NEOPLASM OF OTHER SPECIFIED SITES: ICD-10-CM

## 2023-09-12 DIAGNOSIS — M10.9 GOUT, UNSPECIFIED: ICD-10-CM

## 2023-09-12 DIAGNOSIS — Z90.710 ACQUIRED ABSENCE OF BOTH CERVIX AND UTERUS: ICD-10-CM

## 2023-09-12 DIAGNOSIS — M17.0 BILATERAL PRIMARY OSTEOARTHRITIS OF KNEE: ICD-10-CM

## 2023-09-15 ENCOUNTER — NON-APPOINTMENT (OUTPATIENT)
Age: 73
End: 2023-09-15

## 2023-09-15 ENCOUNTER — APPOINTMENT (OUTPATIENT)
Dept: GYNECOLOGIC ONCOLOGY | Facility: CLINIC | Age: 73
End: 2023-09-15
Payer: MEDICAID

## 2023-09-15 VITALS
TEMPERATURE: 97.1 F | OXYGEN SATURATION: 97 % | WEIGHT: 176 LBS | DIASTOLIC BLOOD PRESSURE: 78 MMHG | BODY MASS INDEX: 31.18 KG/M2 | SYSTOLIC BLOOD PRESSURE: 131 MMHG | HEIGHT: 63 IN | HEART RATE: 77 BPM

## 2023-09-15 DIAGNOSIS — Z86.39 PERSONAL HISTORY OF OTHER ENDOCRINE, NUTRITIONAL AND METABOLIC DISEASE: ICD-10-CM

## 2023-09-15 DIAGNOSIS — M19.90 UNSPECIFIED OSTEOARTHRITIS, UNSPECIFIED SITE: ICD-10-CM

## 2023-09-15 DIAGNOSIS — Z86.79 PERSONAL HISTORY OF OTHER DISEASES OF THE CIRCULATORY SYSTEM: ICD-10-CM

## 2023-09-15 DIAGNOSIS — Z87.39 PERSONAL HISTORY OF OTHER DISEASES OF THE MUSCULOSKELETAL SYSTEM AND CONNECTIVE TISSUE: ICD-10-CM

## 2023-09-15 PROCEDURE — 99024 POSTOP FOLLOW-UP VISIT: CPT

## 2023-10-08 PROCEDURE — 86850 RBC ANTIBODY SCREEN: CPT

## 2023-10-08 PROCEDURE — 82570 ASSAY OF URINE CREATININE: CPT

## 2023-10-08 PROCEDURE — 82962 GLUCOSE BLOOD TEST: CPT

## 2023-10-08 PROCEDURE — 36415 COLL VENOUS BLD VENIPUNCTURE: CPT

## 2023-10-08 PROCEDURE — 87340 HEPATITIS B SURFACE AG IA: CPT

## 2023-10-08 PROCEDURE — 86780 TREPONEMA PALLIDUM: CPT

## 2023-10-08 PROCEDURE — 80048 BASIC METABOLIC PNL TOTAL CA: CPT

## 2023-10-08 PROCEDURE — 82378 CARCINOEMBRYONIC ANTIGEN: CPT

## 2023-10-08 PROCEDURE — 86901 BLOOD TYPING SEROLOGIC RH(D): CPT

## 2023-10-08 PROCEDURE — 82803 BLOOD GASES ANY COMBINATION: CPT

## 2023-10-08 PROCEDURE — 83735 ASSAY OF MAGNESIUM: CPT

## 2023-10-08 PROCEDURE — 80076 HEPATIC FUNCTION PANEL: CPT

## 2023-10-08 PROCEDURE — G1004: CPT

## 2023-10-08 PROCEDURE — 85025 COMPLETE CBC W/AUTO DIFF WBC: CPT

## 2023-10-08 PROCEDURE — 88112 CYTOPATH CELL ENHANCE TECH: CPT

## 2023-10-08 PROCEDURE — 88305 TISSUE EXAM BY PATHOLOGIST: CPT

## 2023-10-08 PROCEDURE — 81001 URINALYSIS AUTO W/SCOPE: CPT

## 2023-10-08 PROCEDURE — 85610 PROTHROMBIN TIME: CPT

## 2023-10-08 PROCEDURE — 84300 ASSAY OF URINE SODIUM: CPT

## 2023-10-08 PROCEDURE — 74176 CT ABD & PELVIS W/O CONTRAST: CPT

## 2023-10-08 PROCEDURE — 83935 ASSAY OF URINE OSMOLALITY: CPT

## 2023-10-08 PROCEDURE — 86900 BLOOD TYPING SEROLOGIC ABO: CPT

## 2023-10-08 PROCEDURE — 86803 HEPATITIS C AB TEST: CPT

## 2023-10-08 PROCEDURE — 85730 THROMBOPLASTIN TIME PARTIAL: CPT

## 2023-10-08 PROCEDURE — 71260 CT THORAX DX C+: CPT | Mod: MG

## 2023-10-08 PROCEDURE — 87389 HIV-1 AG W/HIV-1&-2 AB AG IA: CPT

## 2023-10-08 PROCEDURE — C1889: CPT

## 2023-10-08 PROCEDURE — 87521 HEPATITIS C PROBE&RVRS TRNSC: CPT

## 2023-10-08 PROCEDURE — 88304 TISSUE EXAM BY PATHOLOGIST: CPT

## 2023-10-08 PROCEDURE — 71045 X-RAY EXAM CHEST 1 VIEW: CPT

## 2023-10-08 PROCEDURE — 99285 EMERGENCY DEPT VISIT HI MDM: CPT

## 2023-10-08 PROCEDURE — 74177 CT ABD & PELVIS W/CONTRAST: CPT | Mod: MG

## 2023-10-08 PROCEDURE — 88307 TISSUE EXAM BY PATHOLOGIST: CPT

## 2023-10-08 PROCEDURE — C1758: CPT

## 2023-10-08 PROCEDURE — 84100 ASSAY OF PHOSPHORUS: CPT

## 2023-10-08 PROCEDURE — 83036 HEMOGLOBIN GLYCOSYLATED A1C: CPT

## 2023-10-08 PROCEDURE — 86304 IMMUNOASSAY TUMOR CA 125: CPT

## 2024-06-21 NOTE — PATIENT PROFILE ADULT - FUNCTIONAL ASSESSMENT - DAILY ACTIVITY SCORE.
[facility]          Phone: [phone]      Outpatient Physical Therapy  Fax: [phone]                                 10th Visit Report    Date: 6/21/2024  ACCT #: [id number]      Referring Provider (secondary): Justine         Diagnosis: R29.818 (ICD-10-CM) - Difficulty balancing  R26.81 (ICD-10-CM) - Unstable gait  M54.2, G89.29 (ICD-10-CM) - Chronic neck pain      Treatment Diagnosis: Poor balance    Authorization Period: Start: 6/21/2024 End:  7/21/2024  Onset Date: 05/29/23  PT Insurance Information: Medicare  Total # of Visits Approved: 24 Per Physician Order  Total # of Visits to Date: 11    Current Treatment:  Patient Education/HEP, Therapeutic Exercise, Neuro Re-ed, Gait Training, and Therapeutic Activity    Skilled Intervention:  Body Structures, Functions, Activity Limitations Requiring Skilled Therapeutic Intervention: Decreased functional mobility , Decreased ADL status, Decreased ROM, Decreased body mechanics, Decreased tolerance to work activity, Decreased strength, Decreased safe awareness, Decreased endurance, Decreased sensation, Decreased balance, Decreased posture  Assessment: Building strength.  Still challenged with balance work.  Very visually dependent still.  Will re-check MMT and velasquez next week.  Therapy Prognosis: Good  Reduce Falls   , Improve Ambulation, Complete Daily Tasks Safely, and Return to Prior Level of Function    Expectations for Improvement/Time Frame:  4 weeks    Plan  Continue with current plan of care    Goals  Short Term Goals  Time Frame for Short Term Goals: 12 visits  Short Term Goal 1: Patient will improve VELASQUEZ score from 26/56 to 30/56 to demonstrate improved balance and reduced fall risk  Short Term Goal 2: Patient will demonstrate compliance with HEP to optimize therapy progress - MET    Long Term Goals  Time Frame for Long Term Goals : 24 visits  Long Term Goal 1: Patient will improve VELASQUEZ score  24

## (undated) DEVICE — DRSG TELFA 3 X 8

## (undated) DEVICE — PACK EXPLORATORY LAPAROTOMY

## (undated) DEVICE — FOLEY TRAY 16FR 5CC LF UMETER CLOSED

## (undated) DEVICE — PREP CHLORAPREP HI-LITE ORANGE 26ML

## (undated) DEVICE — SUT VICRYL 1 18" CT-1 UNDYED (POP-OFF)

## (undated) DEVICE — TIP METZENBAUM SCISSOR MONOPOLAR ENDOCUT (ORANGE)

## (undated) DEVICE — DRAPE LEGGINGS XL

## (undated) DEVICE — GLV 7 PROTEXIS (WHITE)

## (undated) DEVICE — SUT VICRYL 0 18" CT-1 UNDYED (POP-OFF)

## (undated) DEVICE — CATH ANGIOCATH 12G

## (undated) DEVICE — WARMING BLANKET UPPER ADULT

## (undated) DEVICE — ENDOCATCH 10MM SPECIMEN POUCH

## (undated) DEVICE — PACK GYN WDC

## (undated) DEVICE — TUBING TUR 2 PRONG

## (undated) DEVICE — SUT VICRYL 0 27" UR-6

## (undated) DEVICE — DRSG DERMABOND 0.7ML

## (undated) DEVICE — SUT MONOCRYL 4-0 27" PS-2 UNDYED

## (undated) DEVICE — TROCAR COVIDIEN VERSAONE FIXATION CANNULA 5MM

## (undated) DEVICE — ELCTR BOVIE PENCIL SMOKE EVACUATION

## (undated) DEVICE — SUT PDS II 0 18" ENDOLOOP LIGATURE

## (undated) DEVICE — SUT PDS II 3-0 27" SH UNDYED

## (undated) DEVICE — ELCTR BOVIE TIP BLADE VALLEYLAB 6.5"

## (undated) DEVICE — SUT VICRYL 0 18" TIES

## (undated) DEVICE — DRAPE IRRIGATION POUCH 19X23"

## (undated) DEVICE — TUBING KIT HANG AND GO HYPERTHERMIC PERFUSION

## (undated) DEVICE — FOLEY TRAY 16FR LF URINE METER SURESTEP

## (undated) DEVICE — SOL IRR BAG NS 0.9% 3000ML

## (undated) DEVICE — LIGASURE IMPACT

## (undated) DEVICE — PACK GENERAL CLOSING

## (undated) DEVICE — POSITIONER FOAM EGG CRATE ULNAR 2PCS (PINK)

## (undated) DEVICE — SUT PDS II 3-0 27" SH

## (undated) DEVICE — VENODYNE/SCD SLEEVE CALF MEDIUM

## (undated) DEVICE — SUT VICRYL 2-0 18" CT-1 UNDYED (POP-OFF)

## (undated) DEVICE — SUT VICRYL PLUS 0 36" CT-1

## (undated) DEVICE — TROCAR COVIDIEN VERSAONE BLUNT TIP HASSAN 12MM

## (undated) DEVICE — POSITIONER PINK PAD PIGAZZI SYSTEM XL W ARM PROTECTOR

## (undated) DEVICE — D HELP - CLEARVIEW CLEARIFY SYSTEM